# Patient Record
Sex: MALE | Race: WHITE | HISPANIC OR LATINO | Employment: OTHER | URBAN - METROPOLITAN AREA
[De-identification: names, ages, dates, MRNs, and addresses within clinical notes are randomized per-mention and may not be internally consistent; named-entity substitution may affect disease eponyms.]

---

## 2021-01-01 ENCOUNTER — APPOINTMENT (OUTPATIENT)
Dept: RADIOLOGY | Facility: MEDICAL CENTER | Age: 66
DRG: 207 | End: 2021-01-01
Attending: INTERNAL MEDICINE
Payer: MEDICAID

## 2021-01-01 ENCOUNTER — HOSPITAL ENCOUNTER (INPATIENT)
Dept: RADIOLOGY | Facility: MEDICAL CENTER | Age: 66
DRG: 207 | End: 2021-01-01
Attending: INTERNAL MEDICINE | Admitting: INTERNAL MEDICINE
Payer: MEDICAID

## 2021-01-01 ENCOUNTER — APPOINTMENT (OUTPATIENT)
Dept: CARDIOLOGY | Facility: MEDICAL CENTER | Age: 66
DRG: 207 | End: 2021-01-01
Attending: INTERNAL MEDICINE
Payer: MEDICAID

## 2021-01-01 ENCOUNTER — HOSPITAL ENCOUNTER (INPATIENT)
Facility: MEDICAL CENTER | Age: 66
LOS: 7 days | DRG: 207 | End: 2021-01-19
Attending: INTERNAL MEDICINE | Admitting: INTERNAL MEDICINE
Payer: MEDICAID

## 2021-01-01 VITALS
SYSTOLIC BLOOD PRESSURE: 117 MMHG | OXYGEN SATURATION: 91 % | HEIGHT: 71 IN | HEART RATE: 95 BPM | RESPIRATION RATE: 32 BRPM | TEMPERATURE: 98.4 F | WEIGHT: 141.54 LBS | BODY MASS INDEX: 19.81 KG/M2 | DIASTOLIC BLOOD PRESSURE: 62 MMHG

## 2021-01-01 DIAGNOSIS — J12.82 PNEUMONIA DUE TO COVID-19 VIRUS: ICD-10-CM

## 2021-01-01 DIAGNOSIS — J96.01 ACUTE RESPIRATORY FAILURE WITH HYPOXIA (HCC): ICD-10-CM

## 2021-01-01 DIAGNOSIS — U07.1 PNEUMONIA DUE TO COVID-19 VIRUS: ICD-10-CM

## 2021-01-01 LAB
ACTION RANGE TRIGGERED IACRT: NO
ACTION RANGE TRIGGERED IACRT: YES
ALBUMIN SERPL BCP-MCNC: 1.5 G/DL (ref 3.2–4.9)
ALBUMIN SERPL BCP-MCNC: 2.1 G/DL (ref 3.2–4.9)
ALBUMIN/GLOB SERPL: 0.4 G/DL
ALBUMIN/GLOB SERPL: 0.6 G/DL
ALP SERPL-CCNC: 110 U/L (ref 30–99)
ALP SERPL-CCNC: 62 U/L (ref 30–99)
ALT SERPL-CCNC: 327 U/L (ref 2–50)
ALT SERPL-CCNC: 35 U/L (ref 2–50)
ANION GAP SERPL CALC-SCNC: 10 MMOL/L (ref 7–16)
ANION GAP SERPL CALC-SCNC: 13 MMOL/L (ref 7–16)
ANION GAP SERPL CALC-SCNC: 18 MMOL/L (ref 7–16)
ANION GAP SERPL CALC-SCNC: 5 MMOL/L (ref 7–16)
ANION GAP SERPL CALC-SCNC: 8 MMOL/L (ref 7–16)
ANION GAP SERPL CALC-SCNC: 8 MMOL/L (ref 7–16)
ANION GAP SERPL CALC-SCNC: 9 MMOL/L (ref 7–16)
ANISOCYTOSIS BLD QL SMEAR: ABNORMAL
ANISOCYTOSIS BLD QL SMEAR: ABNORMAL
AST SERPL-CCNC: 56 U/L (ref 12–45)
AST SERPL-CCNC: 779 U/L (ref 12–45)
BACTERIA SPEC RESP CULT: NORMAL
BASE EXCESS BLDA CALC-SCNC: -1 MMOL/L (ref -4–3)
BASE EXCESS BLDA CALC-SCNC: -2 MMOL/L (ref -4–3)
BASE EXCESS BLDA CALC-SCNC: -4 MMOL/L (ref -4–3)
BASE EXCESS BLDA CALC-SCNC: -5 MMOL/L (ref -4–3)
BASE EXCESS BLDA CALC-SCNC: -5 MMOL/L (ref -4–3)
BASE EXCESS BLDA CALC-SCNC: -6 MMOL/L (ref -4–3)
BASE EXCESS BLDA CALC-SCNC: 0 MMOL/L (ref -4–3)
BASE EXCESS BLDA CALC-SCNC: 2 MMOL/L (ref -4–3)
BASOPHILS # BLD AUTO: 0 % (ref 0–1.8)
BASOPHILS # BLD AUTO: 0 % (ref 0–1.8)
BASOPHILS # BLD AUTO: 0.3 % (ref 0–1.8)
BASOPHILS # BLD AUTO: 0.6 % (ref 0–1.8)
BASOPHILS # BLD AUTO: 0.8 % (ref 0–1.8)
BASOPHILS # BLD: 0 K/UL (ref 0–0.12)
BASOPHILS # BLD: 0 K/UL (ref 0–0.12)
BASOPHILS # BLD: 0.04 K/UL (ref 0–0.12)
BASOPHILS # BLD: 0.05 K/UL (ref 0–0.12)
BASOPHILS # BLD: 0.05 K/UL (ref 0–0.12)
BASOPHILS # BLD: 0.06 K/UL (ref 0–0.12)
BASOPHILS # BLD: 0.12 K/UL (ref 0–0.12)
BILIRUB SERPL-MCNC: 0.3 MG/DL (ref 0.1–1.5)
BILIRUB SERPL-MCNC: 0.4 MG/DL (ref 0.1–1.5)
BODY TEMPERATURE: ABNORMAL DEGREES
BUN SERPL-MCNC: 11 MG/DL (ref 8–22)
BUN SERPL-MCNC: 16 MG/DL (ref 8–22)
BUN SERPL-MCNC: 16 MG/DL (ref 8–22)
BUN SERPL-MCNC: 17 MG/DL (ref 8–22)
BUN SERPL-MCNC: 18 MG/DL (ref 8–22)
BUN SERPL-MCNC: 40 MG/DL (ref 8–22)
BUN SERPL-MCNC: 68 MG/DL (ref 8–22)
BURR CELLS BLD QL SMEAR: NORMAL
C DIFF DNA SPEC QL NAA+PROBE: NEGATIVE
C DIFF TOX GENS STL QL NAA+PROBE: NEGATIVE
CALCIUM SERPL-MCNC: 7.5 MG/DL (ref 8.5–10.5)
CALCIUM SERPL-MCNC: 7.6 MG/DL (ref 8.5–10.5)
CALCIUM SERPL-MCNC: 7.7 MG/DL (ref 8.5–10.5)
CALCIUM SERPL-MCNC: 7.8 MG/DL (ref 8.5–10.5)
CHLORIDE SERPL-SCNC: 100 MMOL/L (ref 96–112)
CHLORIDE SERPL-SCNC: 101 MMOL/L (ref 96–112)
CHLORIDE SERPL-SCNC: 103 MMOL/L (ref 96–112)
CHLORIDE SERPL-SCNC: 104 MMOL/L (ref 96–112)
CHLORIDE SERPL-SCNC: 106 MMOL/L (ref 96–112)
CHLORIDE SERPL-SCNC: 108 MMOL/L (ref 96–112)
CHLORIDE SERPL-SCNC: 109 MMOL/L (ref 96–112)
CO2 BLDA-SCNC: 22 MMOL/L (ref 20–33)
CO2 BLDA-SCNC: 24 MMOL/L (ref 20–33)
CO2 BLDA-SCNC: 24 MMOL/L (ref 20–33)
CO2 BLDA-SCNC: 25 MMOL/L (ref 20–33)
CO2 BLDA-SCNC: 25 MMOL/L (ref 20–33)
CO2 BLDA-SCNC: 26 MMOL/L (ref 20–33)
CO2 BLDA-SCNC: 26 MMOL/L (ref 20–33)
CO2 BLDA-SCNC: 27 MMOL/L (ref 20–33)
CO2 BLDA-SCNC: 27 MMOL/L (ref 20–33)
CO2 BLDA-SCNC: 28 MMOL/L (ref 20–33)
CO2 BLDA-SCNC: 28 MMOL/L (ref 20–33)
CO2 BLDA-SCNC: 34 MMOL/L (ref 20–33)
CO2 SERPL-SCNC: 20 MMOL/L (ref 20–33)
CO2 SERPL-SCNC: 21 MMOL/L (ref 20–33)
CO2 SERPL-SCNC: 22 MMOL/L (ref 20–33)
CO2 SERPL-SCNC: 23 MMOL/L (ref 20–33)
CO2 SERPL-SCNC: 23 MMOL/L (ref 20–33)
CO2 SERPL-SCNC: 24 MMOL/L (ref 20–33)
CO2 SERPL-SCNC: 24 MMOL/L (ref 20–33)
CREAT SERPL-MCNC: 0.41 MG/DL (ref 0.5–1.4)
CREAT SERPL-MCNC: 0.64 MG/DL (ref 0.5–1.4)
CREAT SERPL-MCNC: 0.64 MG/DL (ref 0.5–1.4)
CREAT SERPL-MCNC: 0.73 MG/DL (ref 0.5–1.4)
CREAT SERPL-MCNC: 0.85 MG/DL (ref 0.5–1.4)
CREAT SERPL-MCNC: 2.37 MG/DL (ref 0.5–1.4)
CREAT SERPL-MCNC: 3.62 MG/DL (ref 0.5–1.4)
CRP SERPL HS-MCNC: 12.52 MG/DL (ref 0–0.75)
CRP SERPL HS-MCNC: 24.07 MG/DL (ref 0–0.75)
CRP SERPL HS-MCNC: 37.83 MG/DL (ref 0–0.75)
D DIMER PPP IA.FEU-MCNC: 6.41 UG/ML (FEU) (ref 0–0.5)
EKG IMPRESSION: NORMAL
EOSINOPHIL # BLD AUTO: 0 K/UL (ref 0–0.51)
EOSINOPHIL # BLD AUTO: 0.12 K/UL (ref 0–0.51)
EOSINOPHIL # BLD AUTO: 0.14 K/UL (ref 0–0.51)
EOSINOPHIL # BLD AUTO: 0.2 K/UL (ref 0–0.51)
EOSINOPHIL # BLD AUTO: 0.62 K/UL (ref 0–0.51)
EOSINOPHIL NFR BLD: 0 % (ref 0–6.9)
EOSINOPHIL NFR BLD: 0.7 % (ref 0–6.9)
EOSINOPHIL NFR BLD: 0.8 % (ref 0–6.9)
EOSINOPHIL NFR BLD: 1.3 % (ref 0–6.9)
EOSINOPHIL NFR BLD: 2.9 % (ref 0–6.9)
ERYTHROCYTE [DISTWIDTH] IN BLOOD BY AUTOMATED COUNT: 41.2 FL (ref 35.9–50)
ERYTHROCYTE [DISTWIDTH] IN BLOOD BY AUTOMATED COUNT: 41.9 FL (ref 35.9–50)
ERYTHROCYTE [DISTWIDTH] IN BLOOD BY AUTOMATED COUNT: 42.4 FL (ref 35.9–50)
ERYTHROCYTE [DISTWIDTH] IN BLOOD BY AUTOMATED COUNT: 43.3 FL (ref 35.9–50)
ERYTHROCYTE [DISTWIDTH] IN BLOOD BY AUTOMATED COUNT: 46.8 FL (ref 35.9–50)
ERYTHROCYTE [DISTWIDTH] IN BLOOD BY AUTOMATED COUNT: 49 FL (ref 35.9–50)
ERYTHROCYTE [DISTWIDTH] IN BLOOD BY AUTOMATED COUNT: 50.4 FL (ref 35.9–50)
FERRITIN SERPL-MCNC: 2280 NG/ML (ref 22–322)
GLOBULIN SER CALC-MCNC: 3.4 G/DL (ref 1.9–3.5)
GLOBULIN SER CALC-MCNC: 3.7 G/DL (ref 1.9–3.5)
GLUCOSE BLD-MCNC: 133 MG/DL (ref 65–99)
GLUCOSE BLD-MCNC: 158 MG/DL (ref 65–99)
GLUCOSE BLD-MCNC: 159 MG/DL (ref 65–99)
GLUCOSE BLD-MCNC: 166 MG/DL (ref 65–99)
GLUCOSE SERPL-MCNC: 123 MG/DL (ref 65–99)
GLUCOSE SERPL-MCNC: 153 MG/DL (ref 65–99)
GLUCOSE SERPL-MCNC: 161 MG/DL (ref 65–99)
GLUCOSE SERPL-MCNC: 193 MG/DL (ref 65–99)
GLUCOSE SERPL-MCNC: 215 MG/DL (ref 65–99)
GLUCOSE SERPL-MCNC: 94 MG/DL (ref 65–99)
GLUCOSE SERPL-MCNC: 95 MG/DL (ref 65–99)
GRAM STN SPEC: NORMAL
GRAM STN SPEC: NORMAL
HCO3 BLDA-SCNC: 21.3 MMOL/L (ref 17–25)
HCO3 BLDA-SCNC: 22.7 MMOL/L (ref 17–25)
HCO3 BLDA-SCNC: 23.4 MMOL/L (ref 17–25)
HCO3 BLDA-SCNC: 23.5 MMOL/L (ref 17–25)
HCO3 BLDA-SCNC: 23.8 MMOL/L (ref 17–25)
HCO3 BLDA-SCNC: 24.4 MMOL/L (ref 17–25)
HCO3 BLDA-SCNC: 24.6 MMOL/L (ref 17–25)
HCO3 BLDA-SCNC: 25.1 MMOL/L (ref 17–25)
HCO3 BLDA-SCNC: 25.7 MMOL/L (ref 17–25)
HCO3 BLDA-SCNC: 25.8 MMOL/L (ref 17–25)
HCO3 BLDA-SCNC: 26.4 MMOL/L (ref 17–25)
HCO3 BLDA-SCNC: 31.4 MMOL/L (ref 17–25)
HCT VFR BLD AUTO: 39.5 % (ref 42–52)
HCT VFR BLD AUTO: 39.7 % (ref 42–52)
HCT VFR BLD AUTO: 39.8 % (ref 42–52)
HCT VFR BLD AUTO: 40.6 % (ref 42–52)
HCT VFR BLD AUTO: 42.2 % (ref 42–52)
HCT VFR BLD AUTO: 43.4 % (ref 42–52)
HCT VFR BLD AUTO: 47.3 % (ref 42–52)
HGB BLD-MCNC: 12.4 G/DL (ref 14–18)
HGB BLD-MCNC: 12.8 G/DL (ref 14–18)
HGB BLD-MCNC: 12.8 G/DL (ref 14–18)
HGB BLD-MCNC: 13.3 G/DL (ref 14–18)
HGB BLD-MCNC: 13.7 G/DL (ref 14–18)
HGB BLD-MCNC: 14.3 G/DL (ref 14–18)
HGB BLD-MCNC: 14.8 G/DL (ref 14–18)
HOROWITZ INDEX BLDA+IHG-RTO: 104 MM[HG]
HOROWITZ INDEX BLDA+IHG-RTO: 105 MM[HG]
HOROWITZ INDEX BLDA+IHG-RTO: 106 MM[HG]
HOROWITZ INDEX BLDA+IHG-RTO: 112 MM[HG]
HOROWITZ INDEX BLDA+IHG-RTO: 131 MM[HG]
HOROWITZ INDEX BLDA+IHG-RTO: 38 MM[HG]
HOROWITZ INDEX BLDA+IHG-RTO: 46 MM[HG]
HOROWITZ INDEX BLDA+IHG-RTO: 51 MM[HG]
HOROWITZ INDEX BLDA+IHG-RTO: 63 MM[HG]
HOROWITZ INDEX BLDA+IHG-RTO: 73 MM[HG]
HOROWITZ INDEX BLDA+IHG-RTO: 81 MM[HG]
HOROWITZ INDEX BLDA+IHG-RTO: 89 MM[HG]
IMM GRANULOCYTES # BLD AUTO: 0.23 K/UL (ref 0–0.11)
IMM GRANULOCYTES # BLD AUTO: 0.31 K/UL (ref 0–0.11)
IMM GRANULOCYTES NFR BLD AUTO: 1.3 % (ref 0–0.9)
IMM GRANULOCYTES NFR BLD AUTO: 1.4 % (ref 0–0.9)
IMM GRANULOCYTES NFR BLD AUTO: 1.5 % (ref 0–0.9)
IMM GRANULOCYTES NFR BLD AUTO: 1.5 % (ref 0–0.9)
INST. QUALIFIED PATIENT IIQPT: YES
LACTATE BLD-SCNC: 2.6 MMOL/L (ref 0.5–2)
LACTATE BLD-SCNC: 3.5 MMOL/L (ref 0.5–2)
LACTATE BLD-SCNC: 3.8 MMOL/L (ref 0.5–2)
LV EJECT FRACT  99904: 75
LV EJECT FRACT MOD 2C 99903: 73.58
LV EJECT FRACT MOD 4C 99902: 76.25
LV EJECT FRACT MOD BP 99901: 74.66
LYMPHOCYTES # BLD AUTO: 0.1 K/UL (ref 1–4.8)
LYMPHOCYTES # BLD AUTO: 0.44 K/UL (ref 1–4.8)
LYMPHOCYTES # BLD AUTO: 0.55 K/UL (ref 1–4.8)
LYMPHOCYTES # BLD AUTO: 0.61 K/UL (ref 1–4.8)
LYMPHOCYTES # BLD AUTO: 0.67 K/UL (ref 1–4.8)
LYMPHOCYTES # BLD AUTO: 0.69 K/UL (ref 1–4.8)
LYMPHOCYTES # BLD AUTO: 0.77 K/UL (ref 1–4.8)
LYMPHOCYTES NFR BLD: 1.6 % (ref 22–41)
LYMPHOCYTES NFR BLD: 1.7 % (ref 22–41)
LYMPHOCYTES NFR BLD: 2.6 % (ref 22–41)
LYMPHOCYTES NFR BLD: 3.4 % (ref 22–41)
LYMPHOCYTES NFR BLD: 3.5 % (ref 22–41)
LYMPHOCYTES NFR BLD: 3.6 % (ref 22–41)
LYMPHOCYTES NFR BLD: 4.5 % (ref 22–41)
MACROCYTES BLD QL SMEAR: ABNORMAL
MACROCYTES BLD QL SMEAR: ABNORMAL
MAGNESIUM SERPL-MCNC: 2.1 MG/DL (ref 1.5–2.5)
MAGNESIUM SERPL-MCNC: 2.2 MG/DL (ref 1.5–2.5)
MAGNESIUM SERPL-MCNC: 2.4 MG/DL (ref 1.5–2.5)
MAGNESIUM SERPL-MCNC: 2.6 MG/DL (ref 1.5–2.5)
MANUAL DIFF BLD: ABNORMAL
MCH RBC QN AUTO: 29.9 PG (ref 27–33)
MCH RBC QN AUTO: 30 PG (ref 27–33)
MCH RBC QN AUTO: 30.3 PG (ref 27–33)
MCH RBC QN AUTO: 30.4 PG (ref 27–33)
MCH RBC QN AUTO: 30.5 PG (ref 27–33)
MCH RBC QN AUTO: 30.7 PG (ref 27–33)
MCH RBC QN AUTO: 31.1 PG (ref 27–33)
MCHC RBC AUTO-ENTMCNC: 30.3 G/DL (ref 33.7–35.3)
MCHC RBC AUTO-ENTMCNC: 31.3 G/DL (ref 33.7–35.3)
MCHC RBC AUTO-ENTMCNC: 31.4 G/DL (ref 33.7–35.3)
MCHC RBC AUTO-ENTMCNC: 32.2 G/DL (ref 33.7–35.3)
MCHC RBC AUTO-ENTMCNC: 32.9 G/DL (ref 33.7–35.3)
MCHC RBC AUTO-ENTMCNC: 33.4 G/DL (ref 33.7–35.3)
MCHC RBC AUTO-ENTMCNC: 33.7 G/DL (ref 33.7–35.3)
MCV RBC AUTO: 91 FL (ref 81.4–97.8)
MCV RBC AUTO: 92.1 FL (ref 81.4–97.8)
MCV RBC AUTO: 92.8 FL (ref 81.4–97.8)
MCV RBC AUTO: 93 FL (ref 81.4–97.8)
MCV RBC AUTO: 96.7 FL (ref 81.4–97.8)
MCV RBC AUTO: 97.1 FL (ref 81.4–97.8)
MCV RBC AUTO: 99.1 FL (ref 81.4–97.8)
METAMYELOCYTES NFR BLD MANUAL: 4.1 %
METAMYELOCYTES NFR BLD MANUAL: 5.3 %
METAMYELOCYTES NFR BLD MANUAL: 6.7 %
MICROCYTES BLD QL SMEAR: ABNORMAL
MONOCYTES # BLD AUTO: 0.16 K/UL (ref 0–0.85)
MONOCYTES # BLD AUTO: 0.59 K/UL (ref 0–0.85)
MONOCYTES # BLD AUTO: 0.63 K/UL (ref 0–0.85)
MONOCYTES # BLD AUTO: 0.75 K/UL (ref 0–0.85)
MONOCYTES # BLD AUTO: 0.87 K/UL (ref 0–0.85)
MONOCYTES # BLD AUTO: 1.12 K/UL (ref 0–0.85)
MONOCYTES # BLD AUTO: 1.14 K/UL (ref 0–0.85)
MONOCYTES NFR BLD AUTO: 2.5 % (ref 0–13.4)
MONOCYTES NFR BLD AUTO: 3.4 % (ref 0–13.4)
MONOCYTES NFR BLD AUTO: 3.8 % (ref 0–13.4)
MONOCYTES NFR BLD AUTO: 3.9 % (ref 0–13.4)
MONOCYTES NFR BLD AUTO: 4.2 % (ref 0–13.4)
MONOCYTES NFR BLD AUTO: 4.4 % (ref 0–13.4)
MONOCYTES NFR BLD AUTO: 5.3 % (ref 0–13.4)
MORPHOLOGY BLD-IMP: NORMAL
MRSA DNA SPEC QL NAA+PROBE: NORMAL
MYELOCYTES NFR BLD MANUAL: 0.8 %
MYELOCYTES NFR BLD MANUAL: 0.9 %
NEUTROPHILS # BLD AUTO: 13.73 K/UL (ref 1.82–7.42)
NEUTROPHILS # BLD AUTO: 14.71 K/UL (ref 1.82–7.42)
NEUTROPHILS # BLD AUTO: 15.86 K/UL (ref 1.82–7.42)
NEUTROPHILS # BLD AUTO: 18.2 K/UL (ref 1.82–7.42)
NEUTROPHILS # BLD AUTO: 22.51 K/UL (ref 1.82–7.42)
NEUTROPHILS # BLD AUTO: 22.58 K/UL (ref 1.82–7.42)
NEUTROPHILS # BLD AUTO: 5.86 K/UL (ref 1.82–7.42)
NEUTROPHILS NFR BLD: 70.6 % (ref 44–72)
NEUTROPHILS NFR BLD: 71.1 % (ref 44–72)
NEUTROPHILS NFR BLD: 71.3 % (ref 44–72)
NEUTROPHILS NFR BLD: 86.1 % (ref 44–72)
NEUTROPHILS NFR BLD: 88.6 % (ref 44–72)
NEUTROPHILS NFR BLD: 89.9 % (ref 44–72)
NEUTROPHILS NFR BLD: 90.3 % (ref 44–72)
NEUTS BAND NFR BLD MANUAL: 15.8 % (ref 0–10)
NEUTS BAND NFR BLD MANUAL: 17.6 % (ref 0–10)
NEUTS BAND NFR BLD MANUAL: 18.9 % (ref 0–10)
NRBC # BLD AUTO: 0 K/UL
NRBC # BLD AUTO: 0.02 K/UL
NRBC # BLD AUTO: 0.04 K/UL
NRBC BLD-RTO: 0 /100 WBC
NRBC BLD-RTO: 0.1 /100 WBC
NRBC BLD-RTO: 0.6 /100 WBC
O2/TOTAL GAS SETTING VFR VENT: 100 %
O2/TOTAL GAS SETTING VFR VENT: 50 %
O2/TOTAL GAS SETTING VFR VENT: 55 %
O2/TOTAL GAS SETTING VFR VENT: 60 %
O2/TOTAL GAS SETTING VFR VENT: 80 %
O2/TOTAL GAS SETTING VFR VENT: 90 %
PCO2 BLDA: 28.7 MMHG (ref 26–37)
PCO2 BLDA: 36.5 MMHG (ref 26–37)
PCO2 BLDA: 36.6 MMHG (ref 26–37)
PCO2 BLDA: 40.6 MMHG (ref 26–37)
PCO2 BLDA: 45.4 MMHG (ref 26–37)
PCO2 BLDA: 50.1 MMHG (ref 26–37)
PCO2 BLDA: 51.6 MMHG (ref 26–37)
PCO2 BLDA: 53.6 MMHG (ref 26–37)
PCO2 BLDA: 67.6 MMHG (ref 26–37)
PCO2 BLDA: 72 MMHG (ref 26–37)
PCO2 BLDA: 73.1 MMHG (ref 26–37)
PCO2 BLDA: 84.1 MMHG (ref 26–37)
PCO2 TEMP ADJ BLDA: 28.2 MMHG (ref 26–37)
PCO2 TEMP ADJ BLDA: 36.1 MMHG (ref 26–37)
PCO2 TEMP ADJ BLDA: 36.8 MMHG (ref 26–37)
PCO2 TEMP ADJ BLDA: 42.3 MMHG (ref 26–37)
PCO2 TEMP ADJ BLDA: 44.3 MMHG (ref 26–37)
PCO2 TEMP ADJ BLDA: 48.5 MMHG (ref 26–37)
PCO2 TEMP ADJ BLDA: 51.8 MMHG (ref 26–37)
PCO2 TEMP ADJ BLDA: 51.8 MMHG (ref 26–37)
PCO2 TEMP ADJ BLDA: 69.3 MMHG (ref 26–37)
PCO2 TEMP ADJ BLDA: 72.3 MMHG (ref 26–37)
PCO2 TEMP ADJ BLDA: 73.1 MMHG (ref 26–37)
PCO2 TEMP ADJ BLDA: 93.8 MMHG (ref 26–37)
PH BLDA: 7.09 [PH] (ref 7.4–7.5)
PH BLDA: 7.14 [PH] (ref 7.4–7.5)
PH BLDA: 7.17 [PH] (ref 7.4–7.5)
PH BLDA: 7.25 [PH] (ref 7.4–7.5)
PH BLDA: 7.27 [PH] (ref 7.4–7.5)
PH BLDA: 7.29 [PH] (ref 7.4–7.5)
PH BLDA: 7.33 [PH] (ref 7.4–7.5)
PH BLDA: 7.34 [PH] (ref 7.4–7.5)
PH BLDA: 7.38 [PH] (ref 7.4–7.5)
PH BLDA: 7.4 [PH] (ref 7.4–7.5)
PH BLDA: 7.41 [PH] (ref 7.4–7.5)
PH BLDA: 7.48 [PH] (ref 7.4–7.5)
PH TEMP ADJ BLDA: 7.06 [PH] (ref 7.4–7.5)
PH TEMP ADJ BLDA: 7.14 [PH] (ref 7.4–7.5)
PH TEMP ADJ BLDA: 7.16 [PH] (ref 7.4–7.5)
PH TEMP ADJ BLDA: 7.25 [PH] (ref 7.4–7.5)
PH TEMP ADJ BLDA: 7.26 [PH] (ref 7.4–7.5)
PH TEMP ADJ BLDA: 7.3 [PH] (ref 7.4–7.5)
PH TEMP ADJ BLDA: 7.34 [PH] (ref 7.4–7.5)
PH TEMP ADJ BLDA: 7.35 [PH] (ref 7.4–7.5)
PH TEMP ADJ BLDA: 7.36 [PH] (ref 7.4–7.5)
PH TEMP ADJ BLDA: 7.4 [PH] (ref 7.4–7.5)
PH TEMP ADJ BLDA: 7.42 [PH] (ref 7.4–7.5)
PH TEMP ADJ BLDA: 7.48 [PH] (ref 7.4–7.5)
PHENYTOIN SERPL-MCNC: <0.8 UG/ML (ref 10–20)
PHOSPHATE SERPL-MCNC: 1.8 MG/DL (ref 2.5–4.5)
PHOSPHATE SERPL-MCNC: 2.2 MG/DL (ref 2.5–4.5)
PHOSPHATE SERPL-MCNC: 2.7 MG/DL (ref 2.5–4.5)
PHOSPHATE SERPL-MCNC: 3.2 MG/DL (ref 2.5–4.5)
PHOSPHATE SERPL-MCNC: 3.7 MG/DL (ref 2.5–4.5)
PHOSPHATE SERPL-MCNC: 5.2 MG/DL (ref 2.5–4.5)
PLATELET # BLD AUTO: 219 K/UL (ref 164–446)
PLATELET # BLD AUTO: 237 K/UL (ref 164–446)
PLATELET # BLD AUTO: 246 K/UL (ref 164–446)
PLATELET # BLD AUTO: 255 K/UL (ref 164–446)
PLATELET # BLD AUTO: 272 K/UL (ref 164–446)
PLATELET # BLD AUTO: 276 K/UL (ref 164–446)
PLATELET # BLD AUTO: 297 K/UL (ref 164–446)
PLATELET BLD QL SMEAR: NORMAL
PMV BLD AUTO: 10.8 FL (ref 9–12.9)
PMV BLD AUTO: 11 FL (ref 9–12.9)
PMV BLD AUTO: 9.3 FL (ref 9–12.9)
PMV BLD AUTO: 9.3 FL (ref 9–12.9)
PMV BLD AUTO: 9.5 FL (ref 9–12.9)
PMV BLD AUTO: 9.5 FL (ref 9–12.9)
PMV BLD AUTO: 9.7 FL (ref 9–12.9)
PO2 BLDA: 131 MMHG (ref 64–87)
PO2 BLDA: 38 MMHG (ref 64–87)
PO2 BLDA: 46 MMHG (ref 64–87)
PO2 BLDA: 51 MMHG (ref 64–87)
PO2 BLDA: 53 MMHG (ref 64–87)
PO2 BLDA: 58 MMHG (ref 64–87)
PO2 BLDA: 63 MMHG (ref 64–87)
PO2 BLDA: 67 MMHG (ref 64–87)
PO2 BLDA: 73 MMHG (ref 64–87)
PO2 BLDA: 73 MMHG (ref 64–87)
PO2 BLDA: 83 MMHG (ref 64–87)
PO2 BLDA: 89 MMHG (ref 64–87)
PO2 TEMP ADJ BLDA: 128 MMHG (ref 64–87)
PO2 TEMP ADJ BLDA: 38 MMHG (ref 64–87)
PO2 TEMP ADJ BLDA: 44 MMHG (ref 64–87)
PO2 TEMP ADJ BLDA: 50 MMHG (ref 64–87)
PO2 TEMP ADJ BLDA: 51 MMHG (ref 64–87)
PO2 TEMP ADJ BLDA: 59 MMHG (ref 64–87)
PO2 TEMP ADJ BLDA: 66 MMHG (ref 64–87)
PO2 TEMP ADJ BLDA: 68 MMHG (ref 64–87)
PO2 TEMP ADJ BLDA: 77 MMHG (ref 64–87)
PO2 TEMP ADJ BLDA: 79 MMHG (ref 64–87)
PO2 TEMP ADJ BLDA: 87 MMHG (ref 64–87)
PO2 TEMP ADJ BLDA: 90 MMHG (ref 64–87)
POIKILOCYTOSIS BLD QL SMEAR: NORMAL
POIKILOCYTOSIS BLD QL SMEAR: NORMAL
POLYCHROMASIA BLD QL SMEAR: NORMAL
POTASSIUM SERPL-SCNC: 3.4 MMOL/L (ref 3.6–5.5)
POTASSIUM SERPL-SCNC: 3.8 MMOL/L (ref 3.6–5.5)
POTASSIUM SERPL-SCNC: 3.9 MMOL/L (ref 3.6–5.5)
POTASSIUM SERPL-SCNC: 4.2 MMOL/L (ref 3.6–5.5)
POTASSIUM SERPL-SCNC: 4.5 MMOL/L (ref 3.6–5.5)
POTASSIUM SERPL-SCNC: 5.7 MMOL/L (ref 3.6–5.5)
POTASSIUM SERPL-SCNC: 5.7 MMOL/L (ref 3.6–5.5)
PREALB SERPL-MCNC: 12 MG/DL (ref 18–38)
PREALB SERPL-MCNC: 5.4 MG/DL (ref 18–38)
PROCALCITONIN SERPL-MCNC: 9.85 NG/ML
PROT SERPL-MCNC: 5.2 G/DL (ref 6–8.2)
PROT SERPL-MCNC: 5.5 G/DL (ref 6–8.2)
RBC # BLD AUTO: 4.07 M/UL (ref 4.7–6.1)
RBC # BLD AUTO: 4.26 M/UL (ref 4.7–6.1)
RBC # BLD AUTO: 4.28 M/UL (ref 4.7–6.1)
RBC # BLD AUTO: 4.28 M/UL (ref 4.7–6.1)
RBC # BLD AUTO: 4.46 M/UL (ref 4.7–6.1)
RBC # BLD AUTO: 4.71 M/UL (ref 4.7–6.1)
RBC # BLD AUTO: 4.89 M/UL (ref 4.7–6.1)
RBC BLD AUTO: PRESENT
SAO2 % BLDA: 73 % (ref 93–99)
SAO2 % BLDA: 74 % (ref 93–99)
SAO2 % BLDA: 84 % (ref 93–99)
SAO2 % BLDA: 84 % (ref 93–99)
SAO2 % BLDA: 85 % (ref 93–99)
SAO2 % BLDA: 85 % (ref 93–99)
SAO2 % BLDA: 86 % (ref 93–99)
SAO2 % BLDA: 89 % (ref 93–99)
SAO2 % BLDA: 94 % (ref 93–99)
SAO2 % BLDA: 95 % (ref 93–99)
SAO2 % BLDA: 97 % (ref 93–99)
SAO2 % BLDA: 99 % (ref 93–99)
SIGNIFICANT IND 70042: NORMAL
SITE SITE: NORMAL
SODIUM SERPL-SCNC: 132 MMOL/L (ref 135–145)
SODIUM SERPL-SCNC: 134 MMOL/L (ref 135–145)
SODIUM SERPL-SCNC: 135 MMOL/L (ref 135–145)
SODIUM SERPL-SCNC: 136 MMOL/L (ref 135–145)
SODIUM SERPL-SCNC: 136 MMOL/L (ref 135–145)
SODIUM SERPL-SCNC: 139 MMOL/L (ref 135–145)
SODIUM SERPL-SCNC: 147 MMOL/L (ref 135–145)
SOURCE SOURCE: NORMAL
SPECIMEN DRAWN FROM PATIENT: ABNORMAL
TRIGL SERPL-MCNC: 206 MG/DL (ref 0–149)
TRIGL SERPL-MCNC: 284 MG/DL (ref 0–149)
TRIGL SERPL-MCNC: 370 MG/DL (ref 0–149)
VANCOMYCIN SERPL-MCNC: 11.8 UG/ML
WBC # BLD AUTO: 15.5 K/UL (ref 4.8–10.8)
WBC # BLD AUTO: 16.3 K/UL (ref 4.8–10.8)
WBC # BLD AUTO: 17.7 K/UL (ref 4.8–10.8)
WBC # BLD AUTO: 21.1 K/UL (ref 4.8–10.8)
WBC # BLD AUTO: 25.6 K/UL (ref 4.8–10.8)
WBC # BLD AUTO: 25.9 K/UL (ref 4.8–10.8)
WBC # BLD AUTO: 6.5 K/UL (ref 4.8–10.8)

## 2021-01-01 PROCEDURE — 5A1935Z RESPIRATORY VENTILATION, LESS THAN 24 CONSECUTIVE HOURS: ICD-10-PCS | Performed by: INTERNAL MEDICINE

## 2021-01-01 PROCEDURE — 99291 CRITICAL CARE FIRST HOUR: CPT | Performed by: EMERGENCY MEDICINE

## 2021-01-01 PROCEDURE — 94799 UNLISTED PULMONARY SVC/PX: CPT

## 2021-01-01 PROCEDURE — 700111 HCHG RX REV CODE 636 W/ 250 OVERRIDE (IP): Performed by: INTERNAL MEDICINE

## 2021-01-01 PROCEDURE — 0B9F8ZX DRAINAGE OF RIGHT LOWER LUNG LOBE, VIA NATURAL OR ARTIFICIAL OPENING ENDOSCOPIC, DIAGNOSTIC: ICD-10-PCS | Performed by: INTERNAL MEDICINE

## 2021-01-01 PROCEDURE — 83735 ASSAY OF MAGNESIUM: CPT

## 2021-01-01 PROCEDURE — 700102 HCHG RX REV CODE 250 W/ 637 OVERRIDE(OP): Performed by: INTERNAL MEDICINE

## 2021-01-01 PROCEDURE — 87641 MR-STAPH DNA AMP PROBE: CPT

## 2021-01-01 PROCEDURE — 80053 COMPREHEN METABOLIC PANEL: CPT

## 2021-01-01 PROCEDURE — 84100 ASSAY OF PHOSPHORUS: CPT

## 2021-01-01 PROCEDURE — 87070 CULTURE OTHR SPECIMN AEROBIC: CPT

## 2021-01-01 PROCEDURE — 302978 HCHG BRONCHOSCOPY-DIAGNOSTIC

## 2021-01-01 PROCEDURE — 94760 N-INVAS EAR/PLS OXIMETRY 1: CPT

## 2021-01-01 PROCEDURE — 94003 VENT MGMT INPAT SUBQ DAY: CPT

## 2021-01-01 PROCEDURE — A9270 NON-COVERED ITEM OR SERVICE: HCPCS | Performed by: INTERNAL MEDICINE

## 2021-01-01 PROCEDURE — 0B958ZZ DRAINAGE OF RIGHT MIDDLE LOBE BRONCHUS, VIA NATURAL OR ARTIFICIAL OPENING ENDOSCOPIC: ICD-10-PCS | Performed by: INTERNAL MEDICINE

## 2021-01-01 PROCEDURE — 36620 INSERTION CATHETER ARTERY: CPT | Performed by: INTERNAL MEDICINE

## 2021-01-01 PROCEDURE — 87040 BLOOD CULTURE FOR BACTERIA: CPT

## 2021-01-01 PROCEDURE — 86140 C-REACTIVE PROTEIN: CPT

## 2021-01-01 PROCEDURE — 700101 HCHG RX REV CODE 250: Performed by: INTERNAL MEDICINE

## 2021-01-01 PROCEDURE — 03HY32Z INSERTION OF MONITORING DEVICE INTO UPPER ARTERY, PERCUTANEOUS APPROACH: ICD-10-PCS | Performed by: INTERNAL MEDICINE

## 2021-01-01 PROCEDURE — 84478 ASSAY OF TRIGLYCERIDES: CPT

## 2021-01-01 PROCEDURE — 82962 GLUCOSE BLOOD TEST: CPT | Mod: 91

## 2021-01-01 PROCEDURE — 93005 ELECTROCARDIOGRAM TRACING: CPT | Performed by: INTERNAL MEDICINE

## 2021-01-01 PROCEDURE — 0B9B8ZZ DRAINAGE OF LEFT LOWER LOBE BRONCHUS, VIA NATURAL OR ARTIFICIAL OPENING ENDOSCOPIC: ICD-10-PCS | Performed by: INTERNAL MEDICINE

## 2021-01-01 PROCEDURE — 0B968ZZ DRAINAGE OF RIGHT LOWER LOBE BRONCHUS, VIA NATURAL OR ARTIFICIAL OPENING ENDOSCOPIC: ICD-10-PCS | Performed by: INTERNAL MEDICINE

## 2021-01-01 PROCEDURE — 80202 ASSAY OF VANCOMYCIN: CPT

## 2021-01-01 PROCEDURE — 770022 HCHG ROOM/CARE - ICU (200)

## 2021-01-01 PROCEDURE — 82803 BLOOD GASES ANY COMBINATION: CPT

## 2021-01-01 PROCEDURE — 83605 ASSAY OF LACTIC ACID: CPT | Mod: 91

## 2021-01-01 PROCEDURE — 99292 CRITICAL CARE ADDL 30 MIN: CPT | Mod: 25 | Performed by: INTERNAL MEDICINE

## 2021-01-01 PROCEDURE — 71045 X-RAY EXAM CHEST 1 VIEW: CPT

## 2021-01-01 PROCEDURE — 700105 HCHG RX REV CODE 258: Performed by: INTERNAL MEDICINE

## 2021-01-01 PROCEDURE — 85025 COMPLETE CBC W/AUTO DIFF WBC: CPT

## 2021-01-01 PROCEDURE — 82728 ASSAY OF FERRITIN: CPT

## 2021-01-01 PROCEDURE — 94640 AIRWAY INHALATION TREATMENT: CPT

## 2021-01-01 PROCEDURE — 0BH17EZ INSERTION OF ENDOTRACHEAL AIRWAY INTO TRACHEA, VIA NATURAL OR ARTIFICIAL OPENING: ICD-10-PCS | Performed by: INTERNAL MEDICINE

## 2021-01-01 PROCEDURE — 85007 BL SMEAR W/DIFF WBC COUNT: CPT

## 2021-01-01 PROCEDURE — 84134 ASSAY OF PREALBUMIN: CPT

## 2021-01-01 PROCEDURE — 85379 FIBRIN DEGRADATION QUANT: CPT

## 2021-01-01 PROCEDURE — 0B988ZZ DRAINAGE OF LEFT UPPER LOBE BRONCHUS, VIA NATURAL OR ARTIFICIAL OPENING ENDOSCOPIC: ICD-10-PCS | Performed by: INTERNAL MEDICINE

## 2021-01-01 PROCEDURE — 99291 CRITICAL CARE FIRST HOUR: CPT | Performed by: INTERNAL MEDICINE

## 2021-01-01 PROCEDURE — 0B948ZZ DRAINAGE OF RIGHT UPPER LOBE BRONCHUS, VIA NATURAL OR ARTIFICIAL OPENING ENDOSCOPIC: ICD-10-PCS | Performed by: INTERNAL MEDICINE

## 2021-01-01 PROCEDURE — 36556 INSERT NON-TUNNEL CV CATH: CPT

## 2021-01-01 PROCEDURE — 85027 COMPLETE CBC AUTOMATED: CPT

## 2021-01-01 PROCEDURE — 93321 DOPPLER ECHO F-UP/LMTD STD: CPT | Mod: 26 | Performed by: INTERNAL MEDICINE

## 2021-01-01 PROCEDURE — 93010 ELECTROCARDIOGRAM REPORT: CPT | Mod: 76 | Performed by: INTERNAL MEDICINE

## 2021-01-01 PROCEDURE — 93308 TTE F-UP OR LMTD: CPT | Mod: 26 | Performed by: INTERNAL MEDICINE

## 2021-01-01 PROCEDURE — 02HV33Z INSERTION OF INFUSION DEVICE INTO SUPERIOR VENA CAVA, PERCUTANEOUS APPROACH: ICD-10-PCS | Performed by: INTERNAL MEDICINE

## 2021-01-01 PROCEDURE — 80185 ASSAY OF PHENYTOIN TOTAL: CPT

## 2021-01-01 PROCEDURE — 99292 CRITICAL CARE ADDL 30 MIN: CPT | Performed by: INTERNAL MEDICINE

## 2021-01-01 PROCEDURE — 31500 INSERT EMERGENCY AIRWAY: CPT

## 2021-01-01 PROCEDURE — 31645 BRNCHSC W/THER ASPIR 1ST: CPT | Performed by: INTERNAL MEDICINE

## 2021-01-01 PROCEDURE — B548ZZA ULTRASONOGRAPHY OF SUPERIOR VENA CAVA, GUIDANCE: ICD-10-PCS | Performed by: INTERNAL MEDICINE

## 2021-01-01 PROCEDURE — 93010 ELECTROCARDIOGRAM REPORT: CPT | Performed by: INTERNAL MEDICINE

## 2021-01-01 PROCEDURE — 700101 HCHG RX REV CODE 250

## 2021-01-01 PROCEDURE — 37799 UNLISTED PX VASCULAR SURGERY: CPT

## 2021-01-01 PROCEDURE — 87205 SMEAR GRAM STAIN: CPT

## 2021-01-01 PROCEDURE — 80048 BASIC METABOLIC PNL TOTAL CA: CPT

## 2021-01-01 PROCEDURE — 4A133B1 MONITORING OF ARTERIAL PRESSURE, PERIPHERAL, PERCUTANEOUS APPROACH: ICD-10-PCS | Performed by: INTERNAL MEDICINE

## 2021-01-01 PROCEDURE — 93325 DOPPLER ECHO COLOR FLOW MAPG: CPT

## 2021-01-01 PROCEDURE — 36556 INSERT NON-TUNNEL CV CATH: CPT | Mod: RT | Performed by: INTERNAL MEDICINE

## 2021-01-01 PROCEDURE — 99291 CRITICAL CARE FIRST HOUR: CPT | Mod: 25 | Performed by: INTERNAL MEDICINE

## 2021-01-01 PROCEDURE — 36600 WITHDRAWAL OF ARTERIAL BLOOD: CPT

## 2021-01-01 PROCEDURE — 5A1955Z RESPIRATORY VENTILATION, GREATER THAN 96 CONSECUTIVE HOURS: ICD-10-PCS | Performed by: INTERNAL MEDICINE

## 2021-01-01 PROCEDURE — C1751 CATH, INF, PER/CENT/MIDLINE: HCPCS

## 2021-01-01 PROCEDURE — 36620 INSERTION CATHETER ARTERY: CPT

## 2021-01-01 PROCEDURE — 94002 VENT MGMT INPAT INIT DAY: CPT

## 2021-01-01 PROCEDURE — 31624 DX BRONCHOSCOPE/LAVAGE: CPT | Mod: 51 | Performed by: INTERNAL MEDICINE

## 2021-01-01 PROCEDURE — 31500 INSERT EMERGENCY AIRWAY: CPT | Performed by: INTERNAL MEDICINE

## 2021-01-01 PROCEDURE — 82962 GLUCOSE BLOOD TEST: CPT

## 2021-01-01 PROCEDURE — 0BP1XDZ REMOVAL OF INTRALUMINAL DEVICE FROM TRACHEA, EXTERNAL APPROACH: ICD-10-PCS | Performed by: INTERNAL MEDICINE

## 2021-01-01 PROCEDURE — 4A133J1 MONITORING OF ARTERIAL PULSE, PERIPHERAL, PERCUTANEOUS APPROACH: ICD-10-PCS | Performed by: INTERNAL MEDICINE

## 2021-01-01 PROCEDURE — 87493 C DIFF AMPLIFIED PROBE: CPT

## 2021-01-01 PROCEDURE — 84145 PROCALCITONIN (PCT): CPT

## 2021-01-01 PROCEDURE — 0B938ZZ DRAINAGE OF RIGHT MAIN BRONCHUS, VIA NATURAL OR ARTIFICIAL OPENING ENDOSCOPIC: ICD-10-PCS | Performed by: INTERNAL MEDICINE

## 2021-01-01 RX ORDER — TRIHEXYPHENIDYL HYDROCHLORIDE 5 MG/1
5 TABLET ORAL
COMMUNITY

## 2021-01-01 RX ORDER — PHENYTOIN 125 MG/5ML
200 SUSPENSION ORAL EVERY 8 HOURS
Status: DISCONTINUED | OUTPATIENT
Start: 2021-01-01 | End: 2021-01-01

## 2021-01-01 RX ORDER — MIDAZOLAM HYDROCHLORIDE 1 MG/ML
1-2 INJECTION INTRAMUSCULAR; INTRAVENOUS ONCE
Status: COMPLETED | OUTPATIENT
Start: 2021-01-01 | End: 2021-01-01

## 2021-01-01 RX ORDER — NOREPINEPHRINE BITARTRATE 0.03 MG/ML
0-30 INJECTION, SOLUTION INTRAVENOUS CONTINUOUS
Status: DISCONTINUED | OUTPATIENT
Start: 2021-01-01 | End: 2021-01-01 | Stop reason: HOSPADM

## 2021-01-01 RX ORDER — ACETAMINOPHEN 325 MG/1
650 TABLET ORAL EVERY 6 HOURS PRN
Status: DISCONTINUED | OUTPATIENT
Start: 2021-01-01 | End: 2021-01-01 | Stop reason: HOSPADM

## 2021-01-01 RX ORDER — FAMOTIDINE 20 MG/1
20 TABLET, FILM COATED ORAL EVERY 12 HOURS
Status: DISCONTINUED | OUTPATIENT
Start: 2021-01-01 | End: 2021-01-01

## 2021-01-01 RX ORDER — TRAMADOL HYDROCHLORIDE 50 MG/1
50 TABLET ORAL
COMMUNITY

## 2021-01-01 RX ORDER — SODIUM CHLORIDE 9 MG/ML
1000 INJECTION, SOLUTION INTRAVENOUS ONCE
Status: COMPLETED | OUTPATIENT
Start: 2021-01-01 | End: 2021-01-01

## 2021-01-01 RX ORDER — ROCURONIUM BROMIDE 10 MG/ML
100 INJECTION, SOLUTION INTRAVENOUS ONCE
Status: COMPLETED | OUTPATIENT
Start: 2021-01-01 | End: 2021-01-01

## 2021-01-01 RX ORDER — VECURONIUM BROMIDE 1 MG/ML
0.1 INJECTION, POWDER, LYOPHILIZED, FOR SOLUTION INTRAVENOUS
Status: DISCONTINUED | OUTPATIENT
Start: 2021-01-01 | End: 2021-01-01

## 2021-01-01 RX ORDER — LEVETIRACETAM 500 MG/1
500 TABLET ORAL EVERY 12 HOURS
Status: DISCONTINUED | OUTPATIENT
Start: 2021-01-01 | End: 2021-01-01

## 2021-01-01 RX ORDER — LORAZEPAM 2 MG/ML
2-4 INJECTION INTRAMUSCULAR
Status: COMPLETED | OUTPATIENT
Start: 2021-01-01 | End: 2021-01-01

## 2021-01-01 RX ORDER — ONDANSETRON 4 MG/1
4 TABLET, ORALLY DISINTEGRATING ORAL EVERY 6 HOURS PRN
Status: DISCONTINUED | OUTPATIENT
Start: 2021-01-01 | End: 2021-01-01 | Stop reason: HOSPADM

## 2021-01-01 RX ORDER — ACETAMINOPHEN 325 MG/1
650 TABLET ORAL EVERY 6 HOURS PRN
Status: DISCONTINUED | OUTPATIENT
Start: 2021-01-01 | End: 2021-01-01

## 2021-01-01 RX ORDER — LEVETIRACETAM 500 MG/1
500 TABLET ORAL 2 TIMES DAILY
COMMUNITY

## 2021-01-01 RX ORDER — LEVETIRACETAM 500 MG/1
500 TABLET ORAL 2 TIMES DAILY
Status: DISCONTINUED | OUTPATIENT
Start: 2021-01-01 | End: 2021-01-01 | Stop reason: HOSPADM

## 2021-01-01 RX ORDER — LORAZEPAM 2 MG/ML
2-4 INJECTION INTRAMUSCULAR ONCE
Status: COMPLETED | OUTPATIENT
Start: 2021-01-01 | End: 2021-01-01

## 2021-01-01 RX ORDER — PHENYLEPHRINE HCL IN 0.9% NACL 0.5 MG/5ML
100 SYRINGE (ML) INTRAVENOUS
Status: DISCONTINUED | OUTPATIENT
Start: 2021-01-01 | End: 2021-01-01

## 2021-01-01 RX ORDER — DEXTROSE MONOHYDRATE 25 G/50ML
50 INJECTION, SOLUTION INTRAVENOUS ONCE
Status: COMPLETED | OUTPATIENT
Start: 2021-01-01 | End: 2021-01-01

## 2021-01-01 RX ORDER — DEXTROSE MONOHYDRATE 25 G/50ML
50 INJECTION, SOLUTION INTRAVENOUS
Status: DISCONTINUED | OUTPATIENT
Start: 2021-01-01 | End: 2021-01-01 | Stop reason: HOSPADM

## 2021-01-01 RX ORDER — PHENYTOIN SODIUM 50 MG/ML
200 INJECTION INTRAMUSCULAR; INTRAVENOUS EVERY 8 HOURS
Status: DISCONTINUED | OUTPATIENT
Start: 2021-01-01 | End: 2021-01-01 | Stop reason: HOSPADM

## 2021-01-01 RX ORDER — PHENYTOIN SODIUM 100 MG/1
300 CAPSULE, EXTENDED RELEASE ORAL 2 TIMES DAILY
COMMUNITY

## 2021-01-01 RX ORDER — LEVETIRACETAM 5 MG/ML
500 INJECTION INTRAVASCULAR EVERY 12 HOURS
Status: DISCONTINUED | OUTPATIENT
Start: 2021-01-01 | End: 2021-01-01

## 2021-01-01 RX ORDER — ROCURONIUM BROMIDE 10 MG/ML
60 INJECTION, SOLUTION INTRAVENOUS ONCE
Status: COMPLETED | OUTPATIENT
Start: 2021-01-01 | End: 2021-01-01

## 2021-01-01 RX ORDER — ONDANSETRON 4 MG/1
4 TABLET, ORALLY DISINTEGRATING ORAL EVERY 6 HOURS PRN
Status: DISCONTINUED | OUTPATIENT
Start: 2021-01-01 | End: 2021-01-01

## 2021-01-01 RX ORDER — DEXMEDETOMIDINE HYDROCHLORIDE 4 UG/ML
INJECTION, SOLUTION INTRAVENOUS
Status: COMPLETED
Start: 2021-01-01 | End: 2021-01-01

## 2021-01-01 RX ORDER — FAMOTIDINE 20 MG/1
20 TABLET, FILM COATED ORAL DAILY
Status: DISCONTINUED | OUTPATIENT
Start: 2021-01-01 | End: 2021-01-01 | Stop reason: HOSPADM

## 2021-01-01 RX ORDER — ONDANSETRON 2 MG/ML
4 INJECTION INTRAMUSCULAR; INTRAVENOUS EVERY 6 HOURS PRN
Status: DISCONTINUED | OUTPATIENT
Start: 2021-01-01 | End: 2021-01-01 | Stop reason: HOSPADM

## 2021-01-01 RX ORDER — HALOPERIDOL 5 MG/ML
2.5 INJECTION INTRAMUSCULAR 2 TIMES DAILY
Status: DISCONTINUED | OUTPATIENT
Start: 2021-01-01 | End: 2021-01-01

## 2021-01-01 RX ORDER — LABETALOL HYDROCHLORIDE 5 MG/ML
10 INJECTION, SOLUTION INTRAVENOUS EVERY 6 HOURS PRN
Status: DISCONTINUED | OUTPATIENT
Start: 2021-01-01 | End: 2021-01-01 | Stop reason: HOSPADM

## 2021-01-01 RX ORDER — SODIUM CHLORIDE, SODIUM LACTATE, POTASSIUM CHLORIDE, CALCIUM CHLORIDE 600; 310; 30; 20 MG/100ML; MG/100ML; MG/100ML; MG/100ML
1000 INJECTION, SOLUTION INTRAVENOUS ONCE
Status: COMPLETED | OUTPATIENT
Start: 2021-01-01 | End: 2021-01-01

## 2021-01-01 RX ORDER — ETOMIDATE 2 MG/ML
20 INJECTION INTRAVENOUS ONCE
Status: COMPLETED | OUTPATIENT
Start: 2021-01-01 | End: 2021-01-01

## 2021-01-01 RX ORDER — HALOPERIDOL 5 MG/ML
2-5 INJECTION INTRAMUSCULAR EVERY 4 HOURS PRN
Status: DISCONTINUED | OUTPATIENT
Start: 2021-01-01 | End: 2021-01-01

## 2021-01-01 RX ORDER — HALOPERIDOL 5 MG/ML
1 INJECTION INTRAMUSCULAR EVERY 4 HOURS PRN
Status: DISCONTINUED | OUTPATIENT
Start: 2021-01-01 | End: 2021-01-01

## 2021-01-01 RX ORDER — HALOPERIDOL 2 MG/1
2 TABLET ORAL 2 TIMES DAILY
Status: DISCONTINUED | OUTPATIENT
Start: 2021-01-01 | End: 2021-01-01 | Stop reason: HOSPADM

## 2021-01-01 RX ORDER — SODIUM CHLORIDE 9 MG/ML
250 INJECTION, SOLUTION INTRAVENOUS ONCE
Status: COMPLETED | OUTPATIENT
Start: 2021-01-01 | End: 2021-01-01

## 2021-01-01 RX ORDER — BISACODYL 10 MG
10 SUPPOSITORY, RECTAL RECTAL
Status: DISCONTINUED | OUTPATIENT
Start: 2021-01-01 | End: 2021-01-01

## 2021-01-01 RX ORDER — FUROSEMIDE 10 MG/ML
40 INJECTION INTRAMUSCULAR; INTRAVENOUS
Status: DISCONTINUED | OUTPATIENT
Start: 2021-01-01 | End: 2021-01-01

## 2021-01-01 RX ORDER — POLYETHYLENE GLYCOL 3350 17 G/17G
1 POWDER, FOR SOLUTION ORAL
Status: DISCONTINUED | OUTPATIENT
Start: 2021-01-01 | End: 2021-01-01

## 2021-01-01 RX ORDER — IPRATROPIUM BROMIDE AND ALBUTEROL SULFATE 2.5; .5 MG/3ML; MG/3ML
3 SOLUTION RESPIRATORY (INHALATION)
Status: DISCONTINUED | OUTPATIENT
Start: 2021-01-01 | End: 2021-01-01 | Stop reason: HOSPADM

## 2021-01-01 RX ORDER — MORPHINE SULFATE 4 MG/ML
2-4 INJECTION, SOLUTION INTRAMUSCULAR; INTRAVENOUS EVERY 4 HOURS PRN
Status: DISCONTINUED | OUTPATIENT
Start: 2021-01-01 | End: 2021-01-01

## 2021-01-01 RX ORDER — HALOPERIDOL 5 MG/1
2.5 TABLET ORAL
COMMUNITY

## 2021-01-01 RX ORDER — MORPHINE SULFATE 4 MG/ML
2-4 INJECTION, SOLUTION INTRAMUSCULAR; INTRAVENOUS
Status: DISCONTINUED | OUTPATIENT
Start: 2021-01-01 | End: 2021-01-01

## 2021-01-01 RX ORDER — LORAZEPAM 2 MG/ML
1-2 INJECTION INTRAMUSCULAR
Status: DISCONTINUED | OUTPATIENT
Start: 2021-01-01 | End: 2021-01-01

## 2021-01-01 RX ORDER — CALCIUM CHLORIDE 100 MG/ML
1 INJECTION INTRAVENOUS; INTRAVENTRICULAR ONCE
Status: DISCONTINUED | OUTPATIENT
Start: 2021-01-01 | End: 2021-01-01

## 2021-01-01 RX ORDER — FUROSEMIDE 10 MG/ML
20 INJECTION INTRAMUSCULAR; INTRAVENOUS ONCE
Status: COMPLETED | OUTPATIENT
Start: 2021-01-01 | End: 2021-01-01

## 2021-01-01 RX ORDER — PHENYTOIN 125 MG/5ML
100 SUSPENSION ORAL 3 TIMES DAILY
Status: DISCONTINUED | OUTPATIENT
Start: 2021-01-01 | End: 2021-01-01

## 2021-01-01 RX ORDER — VECURONIUM BROMIDE 1 MG/ML
0.1 INJECTION, POWDER, LYOPHILIZED, FOR SOLUTION INTRAVENOUS ONCE
Status: DISCONTINUED | OUTPATIENT
Start: 2021-01-01 | End: 2021-01-01

## 2021-01-01 RX ORDER — DEXMEDETOMIDINE HYDROCHLORIDE 4 UG/ML
.1-1.5 INJECTION, SOLUTION INTRAVENOUS CONTINUOUS
Status: DISCONTINUED | OUTPATIENT
Start: 2021-01-01 | End: 2021-01-01

## 2021-01-01 RX ORDER — HEPARIN SODIUM 5000 [USP'U]/ML
5000 INJECTION, SOLUTION INTRAVENOUS; SUBCUTANEOUS EVERY 8 HOURS
Status: DISCONTINUED | OUTPATIENT
Start: 2021-01-01 | End: 2021-01-01 | Stop reason: HOSPADM

## 2021-01-01 RX ORDER — PROPOFOL 10 MG/ML
100-200 INJECTION, EMULSION INTRAVENOUS ONCE
Status: ACTIVE | OUTPATIENT
Start: 2021-01-01 | End: 2021-01-01

## 2021-01-01 RX ORDER — PREGABALIN 150 MG/1
150 CAPSULE ORAL 2 TIMES DAILY
COMMUNITY

## 2021-01-01 RX ORDER — PHENYTOIN 50 MG/1
200 TABLET, CHEWABLE ORAL EVERY 8 HOURS
Status: DISCONTINUED | OUTPATIENT
Start: 2021-01-01 | End: 2021-01-01

## 2021-01-01 RX ORDER — MIDAZOLAM HYDROCHLORIDE 1 MG/ML
5 INJECTION INTRAMUSCULAR; INTRAVENOUS
Status: DISCONTINUED | OUTPATIENT
Start: 2021-01-01 | End: 2021-01-01 | Stop reason: HOSPADM

## 2021-01-01 RX ORDER — AMOXICILLIN 250 MG
2 CAPSULE ORAL 2 TIMES DAILY
Status: DISCONTINUED | OUTPATIENT
Start: 2021-01-01 | End: 2021-01-01

## 2021-01-01 RX ADMIN — PIPERACILLIN AND TAZOBACTAM 4.5 G: 4; .5 INJECTION, POWDER, LYOPHILIZED, FOR SOLUTION INTRAVENOUS; PARENTERAL at 05:03

## 2021-01-01 RX ADMIN — DOCUSATE SODIUM 50 MG AND SENNOSIDES 8.6 MG 2 TABLET: 8.6; 5 TABLET, FILM COATED ORAL at 05:04

## 2021-01-01 RX ADMIN — PROPOFOL 80 MCG/KG/MIN: 10 INJECTION, EMULSION INTRAVENOUS at 05:40

## 2021-01-01 RX ADMIN — POTASSIUM PHOSPHATE, MONOBASIC AND POTASSIUM PHOSPHATE, DIBASIC 30 MMOL: 224; 236 INJECTION, SOLUTION, CONCENTRATE INTRAVENOUS at 08:42

## 2021-01-01 RX ADMIN — DOCUSATE SODIUM 50 MG AND SENNOSIDES 8.6 MG 2 TABLET: 8.6; 5 TABLET, FILM COATED ORAL at 05:27

## 2021-01-01 RX ADMIN — DEXMEDETOMIDINE HYDROCHLORIDE 1.5 MCG/KG/HR: 100 INJECTION, SOLUTION INTRAVENOUS at 03:16

## 2021-01-01 RX ADMIN — HEPARIN SODIUM 5000 UNITS: 5000 INJECTION, SOLUTION INTRAVENOUS; SUBCUTANEOUS at 13:55

## 2021-01-01 RX ADMIN — FENTANYL CITRATE 100 MCG: 50 INJECTION, SOLUTION INTRAMUSCULAR; INTRAVENOUS at 07:32

## 2021-01-01 RX ADMIN — ACETAMINOPHEN 650 MG: 325 TABLET, FILM COATED ORAL at 19:34

## 2021-01-01 RX ADMIN — PROPOFOL 70 MCG/KG/MIN: 10 INJECTION, EMULSION INTRAVENOUS at 07:41

## 2021-01-01 RX ADMIN — PROPOFOL 40 MCG/KG/MIN: 10 INJECTION, EMULSION INTRAVENOUS at 16:33

## 2021-01-01 RX ADMIN — Medication 400 MCG/HR: at 01:45

## 2021-01-01 RX ADMIN — LEVETIRACETAM 500 MG: 500 TABLET ORAL at 05:40

## 2021-01-01 RX ADMIN — PHENYTOIN SODIUM 200 MG: 50 INJECTION INTRAMUSCULAR; INTRAVENOUS at 06:00

## 2021-01-01 RX ADMIN — FAMOTIDINE 20 MG: 10 INJECTION INTRAVENOUS at 05:05

## 2021-01-01 RX ADMIN — PHENYTOIN SODIUM 200 MG: 50 INJECTION INTRAMUSCULAR; INTRAVENOUS at 21:42

## 2021-01-01 RX ADMIN — NOREPINEPHRINE BITARTRATE 14 MCG/MIN: 1 INJECTION, SOLUTION, CONCENTRATE INTRAVENOUS at 12:57

## 2021-01-01 RX ADMIN — PROPOFOL 80 MCG/KG/MIN: 10 INJECTION, EMULSION INTRAVENOUS at 04:29

## 2021-01-01 RX ADMIN — HALOPERIDOL LACTATE 1 MG: 5 INJECTION, SOLUTION INTRAMUSCULAR at 13:13

## 2021-01-01 RX ADMIN — PROPOFOL 50 MCG/KG/MIN: 10 INJECTION, EMULSION INTRAVENOUS at 02:37

## 2021-01-01 RX ADMIN — ENOXAPARIN SODIUM 40 MG: 40 INJECTION SUBCUTANEOUS at 05:27

## 2021-01-01 RX ADMIN — FAMOTIDINE 20 MG: 10 INJECTION INTRAVENOUS at 17:15

## 2021-01-01 RX ADMIN — DEXMEDETOMIDINE HYDROCHLORIDE 1.5 MCG/KG/HR: 100 INJECTION, SOLUTION INTRAVENOUS at 07:02

## 2021-01-01 RX ADMIN — LEVETIRACETAM 500 MG: 500 TABLET ORAL at 05:02

## 2021-01-01 RX ADMIN — ACETAMINOPHEN 650 MG: 325 TABLET, FILM COATED ORAL at 05:05

## 2021-01-01 RX ADMIN — LORAZEPAM 4 MG: 2 INJECTION INTRAMUSCULAR; INTRAVENOUS at 10:30

## 2021-01-01 RX ADMIN — MORPHINE SULFATE 4 MG: 4 INJECTION INTRAVENOUS at 22:21

## 2021-01-01 RX ADMIN — HALOPERIDOL 2 MG: 2 TABLET ORAL at 10:04

## 2021-01-01 RX ADMIN — POTASSIUM PHOSPHATE, MONOBASIC AND POTASSIUM PHOSPHATE, DIBASIC 15 MMOL: 224; 236 INJECTION, SOLUTION, CONCENTRATE INTRAVENOUS at 07:55

## 2021-01-01 RX ADMIN — PHENYTOIN SODIUM 200 MG: 50 INJECTION INTRAMUSCULAR; INTRAVENOUS at 13:43

## 2021-01-01 RX ADMIN — PROPOFOL 25 MCG/KG/MIN: 10 INJECTION, EMULSION INTRAVENOUS at 04:18

## 2021-01-01 RX ADMIN — PROPOFOL 80 MCG/KG/MIN: 10 INJECTION, EMULSION INTRAVENOUS at 07:52

## 2021-01-01 RX ADMIN — PROPOFOL 50 MCG/KG/MIN: 10 INJECTION, EMULSION INTRAVENOUS at 17:21

## 2021-01-01 RX ADMIN — PHENYTOIN 100 MG: 125 SUSPENSION ORAL at 05:30

## 2021-01-01 RX ADMIN — LEVETIRACETAM INJECTION 500 MG: 5 INJECTION INTRAVENOUS at 05:13

## 2021-01-01 RX ADMIN — PROPOFOL 65 MCG/KG/MIN: 10 INJECTION, EMULSION INTRAVENOUS at 21:42

## 2021-01-01 RX ADMIN — NOREPINEPHRINE BITARTRATE 18 MCG/MIN: 1 INJECTION, SOLUTION, CONCENTRATE INTRAVENOUS at 05:43

## 2021-01-01 RX ADMIN — DEXMEDETOMIDINE HYDROCHLORIDE 1 MCG/KG/HR: 100 INJECTION, SOLUTION INTRAVENOUS at 10:15

## 2021-01-01 RX ADMIN — FAMOTIDINE 20 MG: 10 INJECTION INTRAVENOUS at 05:39

## 2021-01-01 RX ADMIN — PIPERACILLIN AND TAZOBACTAM 4.5 G: 4; .5 INJECTION, POWDER, LYOPHILIZED, FOR SOLUTION INTRAVENOUS; PARENTERAL at 11:15

## 2021-01-01 RX ADMIN — HALOPERIDOL 2 MG: 2 TABLET ORAL at 17:21

## 2021-01-01 RX ADMIN — FENTANYL CITRATE 300 MCG/HR: 50 INJECTION, SOLUTION INTRAMUSCULAR; INTRAVENOUS at 14:21

## 2021-01-01 RX ADMIN — NOREPINEPHRINE BITARTRATE 30 MCG/MIN: 1 INJECTION, SOLUTION, CONCENTRATE INTRAVENOUS at 11:14

## 2021-01-01 RX ADMIN — HALOPERIDOL 2 MG: 2 TABLET ORAL at 05:02

## 2021-01-01 RX ADMIN — PHENYTOIN 200 MG: 125 SUSPENSION ORAL at 05:07

## 2021-01-01 RX ADMIN — ACETAMINOPHEN 650 MG: 325 TABLET ORAL at 04:17

## 2021-01-01 RX ADMIN — PROPOFOL 80 MCG/KG/MIN: 10 INJECTION, EMULSION INTRAVENOUS at 07:21

## 2021-01-01 RX ADMIN — HEPARIN SODIUM 5000 UNITS: 5000 INJECTION, SOLUTION INTRAVENOUS; SUBCUTANEOUS at 06:05

## 2021-01-01 RX ADMIN — FENTANYL CITRATE 100 MCG: 50 INJECTION, SOLUTION INTRAMUSCULAR; INTRAVENOUS at 04:08

## 2021-01-01 RX ADMIN — PHENYTOIN SODIUM 200 MG: 50 INJECTION INTRAMUSCULAR; INTRAVENOUS at 21:15

## 2021-01-01 RX ADMIN — NOREPINEPHRINE BITARTRATE 2 MCG/MIN: 1 INJECTION, SOLUTION, CONCENTRATE INTRAVENOUS at 15:15

## 2021-01-01 RX ADMIN — SODIUM CHLORIDE 250 ML: 9 INJECTION, SOLUTION INTRAVENOUS at 23:00

## 2021-01-01 RX ADMIN — LEVETIRACETAM 500 MG: 500 TABLET ORAL at 17:16

## 2021-01-01 RX ADMIN — Medication 1 EACH: at 11:00

## 2021-01-01 RX ADMIN — LEVETIRACETAM 500 MG: 500 TABLET ORAL at 17:15

## 2021-01-01 RX ADMIN — VASOPRESSIN 0.03 UNITS/MIN: 20 INJECTION INTRAVENOUS at 07:18

## 2021-01-01 RX ADMIN — MINERAL OIL, PETROLATUM 1 APPLICATION: 425; 573 OINTMENT OPHTHALMIC at 14:27

## 2021-01-01 RX ADMIN — PROPOFOL 60 MCG/KG/MIN: 10 INJECTION, EMULSION INTRAVENOUS at 14:36

## 2021-01-01 RX ADMIN — FENTANYL CITRATE 100 MCG: 50 INJECTION, SOLUTION INTRAMUSCULAR; INTRAVENOUS at 00:50

## 2021-01-01 RX ADMIN — ROCURONIUM BROMIDE 100 MG: 10 INJECTION, SOLUTION INTRAVENOUS at 01:35

## 2021-01-01 RX ADMIN — PHENYTOIN SODIUM 200 MG: 50 INJECTION INTRAMUSCULAR; INTRAVENOUS at 13:53

## 2021-01-01 RX ADMIN — ENOXAPARIN SODIUM 40 MG: 40 INJECTION SUBCUTANEOUS at 05:39

## 2021-01-01 RX ADMIN — HALOPERIDOL LACTATE 1 MG: 5 INJECTION, SOLUTION INTRAMUSCULAR at 05:04

## 2021-01-01 RX ADMIN — HYDROCORTISONE SODIUM SUCCINATE 100 MG: 100 INJECTION, POWDER, FOR SOLUTION INTRAMUSCULAR; INTRAVENOUS at 22:00

## 2021-01-01 RX ADMIN — DOCUSATE SODIUM 50 MG AND SENNOSIDES 8.6 MG 2 TABLET: 8.6; 5 TABLET, FILM COATED ORAL at 17:16

## 2021-01-01 RX ADMIN — PIPERACILLIN AND TAZOBACTAM 4.5 G: 4; .5 INJECTION, POWDER, LYOPHILIZED, FOR SOLUTION INTRAVENOUS; PARENTERAL at 05:00

## 2021-01-01 RX ADMIN — FENTANYL CITRATE 100 MCG: 50 INJECTION, SOLUTION INTRAMUSCULAR; INTRAVENOUS at 21:16

## 2021-01-01 RX ADMIN — LORAZEPAM 2 MG: 2 INJECTION INTRAMUSCULAR; INTRAVENOUS at 00:30

## 2021-01-01 RX ADMIN — LORAZEPAM 2 MG: 2 INJECTION INTRAMUSCULAR; INTRAVENOUS at 13:22

## 2021-01-01 RX ADMIN — FAMOTIDINE 20 MG: 10 INJECTION INTRAVENOUS at 05:13

## 2021-01-01 RX ADMIN — LORAZEPAM 2 MG: 2 INJECTION INTRAMUSCULAR; INTRAVENOUS at 19:54

## 2021-01-01 RX ADMIN — PROPOFOL 60 MCG/KG/MIN: 10 INJECTION, EMULSION INTRAVENOUS at 17:59

## 2021-01-01 RX ADMIN — SODIUM CHLORIDE, POTASSIUM CHLORIDE, SODIUM LACTATE AND CALCIUM CHLORIDE 1000 ML: 600; 310; 30; 20 INJECTION, SOLUTION INTRAVENOUS at 15:22

## 2021-01-01 RX ADMIN — LEVETIRACETAM INJECTION 500 MG: 5 INJECTION INTRAVENOUS at 12:28

## 2021-01-01 RX ADMIN — Medication 300 MCG: at 21:24

## 2021-01-01 RX ADMIN — LEVETIRACETAM INJECTION 500 MG: 5 INJECTION INTRAVENOUS at 05:30

## 2021-01-01 RX ADMIN — PHENYTOIN 200 MG: 125 SUSPENSION ORAL at 21:59

## 2021-01-01 RX ADMIN — PHENYTOIN SODIUM 200 MG: 50 INJECTION INTRAMUSCULAR; INTRAVENOUS at 21:50

## 2021-01-01 RX ADMIN — DOCUSATE SODIUM 50 MG AND SENNOSIDES 8.6 MG 2 TABLET: 8.6; 5 TABLET, FILM COATED ORAL at 05:05

## 2021-01-01 RX ADMIN — FAMOTIDINE 20 MG: 10 INJECTION INTRAVENOUS at 17:49

## 2021-01-01 RX ADMIN — HYDROCORTISONE SODIUM SUCCINATE 100 MG: 100 INJECTION, POWDER, FOR SOLUTION INTRAMUSCULAR; INTRAVENOUS at 15:22

## 2021-01-01 RX ADMIN — PROPOFOL 50 MCG/KG/MIN: 10 INJECTION, EMULSION INTRAVENOUS at 14:00

## 2021-01-01 RX ADMIN — Medication 300 MCG/HR: at 09:43

## 2021-01-01 RX ADMIN — HALOPERIDOL LACTATE 2.5 MG: 5 INJECTION, SOLUTION INTRAMUSCULAR at 18:14

## 2021-01-01 RX ADMIN — LORAZEPAM 2 MG: 2 INJECTION INTRAMUSCULAR; INTRAVENOUS at 02:19

## 2021-01-01 RX ADMIN — SODIUM CHLORIDE 250 ML: 9 INJECTION, SOLUTION INTRAVENOUS at 00:00

## 2021-01-01 RX ADMIN — PROPOFOL 50 MCG/KG/MIN: 10 INJECTION, EMULSION INTRAVENOUS at 08:05

## 2021-01-01 RX ADMIN — FENTANYL CITRATE 250 MCG: 50 INJECTION, SOLUTION INTRAMUSCULAR; INTRAVENOUS at 22:54

## 2021-01-01 RX ADMIN — DEXMEDETOMIDINE HYDROCHLORIDE 0.75 MCG/KG/HR: 100 INJECTION, SOLUTION INTRAVENOUS at 16:31

## 2021-01-01 RX ADMIN — PROPOFOL 50 MCG/KG/MIN: 10 INJECTION, EMULSION INTRAVENOUS at 21:41

## 2021-01-01 RX ADMIN — ENOXAPARIN SODIUM 40 MG: 40 INJECTION SUBCUTANEOUS at 05:04

## 2021-01-01 RX ADMIN — Medication 300 MCG/HR: at 14:39

## 2021-01-01 RX ADMIN — ENOXAPARIN SODIUM 40 MG: 40 INJECTION SUBCUTANEOUS at 05:13

## 2021-01-01 RX ADMIN — FENTANYL CITRATE 2500 MCG: 50 INJECTION, SOLUTION INTRAMUSCULAR; INTRAVENOUS at 07:22

## 2021-01-01 RX ADMIN — Medication 50 MCG: at 04:07

## 2021-01-01 RX ADMIN — PROPOFOL 80 MCG/KG/MIN: 10 INJECTION, EMULSION INTRAVENOUS at 13:33

## 2021-01-01 RX ADMIN — PROPOFOL 80 MCG/KG/MIN: 10 INJECTION, EMULSION INTRAVENOUS at 22:33

## 2021-01-01 RX ADMIN — PROPOFOL 60 MCG/KG/MIN: 10 INJECTION, EMULSION INTRAVENOUS at 05:15

## 2021-01-01 RX ADMIN — HALOPERIDOL 2 MG: 2 TABLET ORAL at 17:36

## 2021-01-01 RX ADMIN — Medication 100 MCG: at 10:53

## 2021-01-01 RX ADMIN — FAMOTIDINE 20 MG: 10 INJECTION INTRAVENOUS at 05:26

## 2021-01-01 RX ADMIN — FAMOTIDINE 20 MG: 10 INJECTION INTRAVENOUS at 06:05

## 2021-01-01 RX ADMIN — ROCURONIUM BROMIDE 60 MG: 10 INJECTION, SOLUTION INTRAVENOUS at 03:16

## 2021-01-01 RX ADMIN — PROPOFOL 80 MCG/KG/MIN: 10 INJECTION, EMULSION INTRAVENOUS at 01:59

## 2021-01-01 RX ADMIN — FENTANYL CITRATE 200 MCG/HR: 50 INJECTION, SOLUTION INTRAMUSCULAR; INTRAVENOUS at 12:50

## 2021-01-01 RX ADMIN — MIDAZOLAM HYDROCHLORIDE 5 MG: 1 INJECTION, SOLUTION INTRAMUSCULAR; INTRAVENOUS at 01:35

## 2021-01-01 RX ADMIN — PROPOFOL 80 MCG/KG/MIN: 10 INJECTION, EMULSION INTRAVENOUS at 16:57

## 2021-01-01 RX ADMIN — FENTANYL CITRATE 250 MCG: 50 INJECTION, SOLUTION INTRAMUSCULAR; INTRAVENOUS at 04:49

## 2021-01-01 RX ADMIN — PHENYLEPHRINE HYDROCHLORIDE 20 MCG/MIN: 10 INJECTION INTRAVENOUS at 10:10

## 2021-01-01 RX ADMIN — FENTANYL CITRATE 100 MCG: 50 INJECTION, SOLUTION INTRAMUSCULAR; INTRAVENOUS at 03:52

## 2021-01-01 RX ADMIN — NOREPINEPHRINE BITARTRATE 5 MCG/MIN: 1 INJECTION, SOLUTION, CONCENTRATE INTRAVENOUS at 18:01

## 2021-01-01 RX ADMIN — Medication 300 MCG/HR: at 04:04

## 2021-01-01 RX ADMIN — PROPOFOL 60 MCG/KG/MIN: 10 INJECTION, EMULSION INTRAVENOUS at 11:14

## 2021-01-01 RX ADMIN — LEVETIRACETAM INJECTION 500 MG: 5 INJECTION INTRAVENOUS at 21:50

## 2021-01-01 RX ADMIN — HALOPERIDOL 2 MG: 2 TABLET ORAL at 06:00

## 2021-01-01 RX ADMIN — LEVETIRACETAM INJECTION 500 MG: 5 INJECTION INTRAVENOUS at 05:04

## 2021-01-01 RX ADMIN — HALOPERIDOL 2 MG: 2 TABLET ORAL at 17:16

## 2021-01-01 RX ADMIN — Medication 400 MCG/HR: at 08:11

## 2021-01-01 RX ADMIN — DEXMEDETOMIDINE HYDROCHLORIDE 1 MCG/KG/HR: 100 INJECTION, SOLUTION INTRAVENOUS at 01:57

## 2021-01-01 RX ADMIN — DEXMEDETOMIDINE HYDROCHLORIDE 1.5 MCG/KG/HR: 100 INJECTION, SOLUTION INTRAVENOUS at 07:33

## 2021-01-01 RX ADMIN — PHENYTOIN SODIUM 200 MG: 50 INJECTION INTRAMUSCULAR; INTRAVENOUS at 14:38

## 2021-01-01 RX ADMIN — HYDROCORTISONE SODIUM SUCCINATE 100 MG: 100 INJECTION, POWDER, FOR SOLUTION INTRAMUSCULAR; INTRAVENOUS at 13:10

## 2021-01-01 RX ADMIN — DOCUSATE SODIUM 50 MG AND SENNOSIDES 8.6 MG 2 TABLET: 8.6; 5 TABLET, FILM COATED ORAL at 17:36

## 2021-01-01 RX ADMIN — NOREPINEPHRINE BITARTRATE 30 MCG/MIN: 1 INJECTION, SOLUTION, CONCENTRATE INTRAVENOUS at 08:52

## 2021-01-01 RX ADMIN — Medication 2500 MCG: at 17:43

## 2021-01-01 RX ADMIN — PHENYTOIN SODIUM 200 MG: 50 INJECTION INTRAMUSCULAR; INTRAVENOUS at 05:03

## 2021-01-01 RX ADMIN — LEVETIRACETAM 500 MG: 500 TABLET ORAL at 17:21

## 2021-01-01 RX ADMIN — ENOXAPARIN SODIUM 60 MG: 60 INJECTION SUBCUTANEOUS at 05:30

## 2021-01-01 RX ADMIN — VASOPRESSIN 0.03 UNITS/MIN: 20 INJECTION INTRAVENOUS at 13:24

## 2021-01-01 RX ADMIN — PROPOFOL 80 MCG/KG/MIN: 10 INJECTION, EMULSION INTRAVENOUS at 10:18

## 2021-01-01 RX ADMIN — VASOPRESSIN 0.03 UNITS/MIN: 20 INJECTION INTRAVENOUS at 11:14

## 2021-01-01 RX ADMIN — MORPHINE SULFATE 4 MG: 4 INJECTION INTRAVENOUS at 20:15

## 2021-01-01 RX ADMIN — PHENYTOIN SODIUM 200 MG: 50 INJECTION INTRAMUSCULAR; INTRAVENOUS at 05:14

## 2021-01-01 RX ADMIN — PHENYTOIN SODIUM 200 MG: 50 INJECTION INTRAMUSCULAR; INTRAVENOUS at 21:21

## 2021-01-01 RX ADMIN — LORAZEPAM 4 MG: 2 INJECTION INTRAMUSCULAR; INTRAVENOUS at 15:38

## 2021-01-01 RX ADMIN — LORAZEPAM 2 MG: 2 INJECTION INTRAMUSCULAR; INTRAVENOUS at 22:39

## 2021-01-01 RX ADMIN — FUROSEMIDE 20 MG: 10 INJECTION, SOLUTION INTRAMUSCULAR; INTRAVENOUS at 08:49

## 2021-01-01 RX ADMIN — LEVETIRACETAM 500 MG: 500 TABLET ORAL at 05:27

## 2021-01-01 RX ADMIN — MORPHINE SULFATE 4 MG: 4 INJECTION INTRAVENOUS at 05:04

## 2021-01-01 RX ADMIN — SODIUM BICARBONATE 50 MEQ: 84 INJECTION INTRAVENOUS at 10:23

## 2021-01-01 RX ADMIN — NOREPINEPHRINE BITARTRATE 14 MCG/MIN: 1 INJECTION, SOLUTION, CONCENTRATE INTRAVENOUS at 09:39

## 2021-01-01 RX ADMIN — Medication 400 MCG: at 01:34

## 2021-01-01 RX ADMIN — SODIUM CHLORIDE 1000 ML: 9 INJECTION, SOLUTION INTRAVENOUS at 11:15

## 2021-01-01 RX ADMIN — HALOPERIDOL 2 MG: 2 TABLET ORAL at 17:15

## 2021-01-01 RX ADMIN — HYDROCORTISONE SODIUM SUCCINATE 100 MG: 100 INJECTION, POWDER, FOR SOLUTION INTRAMUSCULAR; INTRAVENOUS at 05:03

## 2021-01-01 RX ADMIN — NYSTATIN 5 ML: 100000 SUSPENSION ORAL at 21:59

## 2021-01-01 RX ADMIN — PHENYTOIN SODIUM 200 MG: 50 INJECTION INTRAMUSCULAR; INTRAVENOUS at 14:20

## 2021-01-01 RX ADMIN — INSULIN HUMAN 10 UNITS: 100 INJECTION, SOLUTION PARENTERAL at 10:24

## 2021-01-01 RX ADMIN — VASOPRESSIN 0.03 UNITS/MIN: 20 INJECTION INTRAVENOUS at 17:59

## 2021-01-01 RX ADMIN — DEXMEDETOMIDINE HYDROCHLORIDE 0.8 MCG/KG/HR: 100 INJECTION, SOLUTION INTRAVENOUS at 13:07

## 2021-01-01 RX ADMIN — PROPOFOL 40 MCG/KG/MIN: 10 INJECTION, EMULSION INTRAVENOUS at 23:43

## 2021-01-01 RX ADMIN — CALCIUM CHLORIDE 1000 MG: 100 INJECTION, SOLUTION INTRAVENOUS at 10:24

## 2021-01-01 RX ADMIN — NOREPINEPHRINE BITARTRATE 30 MCG/MIN: 1 INJECTION, SOLUTION, CONCENTRATE INTRAVENOUS at 15:05

## 2021-01-01 RX ADMIN — Medication 400 MCG/HR: at 08:08

## 2021-01-01 RX ADMIN — LORAZEPAM 2 MG: 2 INJECTION INTRAMUSCULAR; INTRAVENOUS at 20:51

## 2021-01-01 RX ADMIN — PROPOFOL 60 MCG/KG/MIN: 10 INJECTION, EMULSION INTRAVENOUS at 10:33

## 2021-01-01 RX ADMIN — HALOPERIDOL LACTATE 1 MG: 5 INJECTION, SOLUTION INTRAMUSCULAR at 23:57

## 2021-01-01 RX ADMIN — FAMOTIDINE 20 MG: 10 INJECTION INTRAVENOUS at 17:36

## 2021-01-01 RX ADMIN — FENTANYL CITRATE 300 MCG/HR: 50 INJECTION, SOLUTION INTRAMUSCULAR; INTRAVENOUS at 13:11

## 2021-01-01 RX ADMIN — PHENYTOIN SODIUM 200 MG: 50 INJECTION INTRAMUSCULAR; INTRAVENOUS at 13:10

## 2021-01-01 RX ADMIN — LEVETIRACETAM INJECTION 500 MG: 5 INJECTION INTRAVENOUS at 17:36

## 2021-01-01 RX ADMIN — PROPOFOL 80 MCG/KG/MIN: 10 INJECTION, EMULSION INTRAVENOUS at 00:59

## 2021-01-01 RX ADMIN — HYDROCORTISONE SODIUM SUCCINATE 100 MG: 100 INJECTION, POWDER, FOR SOLUTION INTRAMUSCULAR; INTRAVENOUS at 21:41

## 2021-01-01 RX ADMIN — HYDROCORTISONE SODIUM SUCCINATE 100 MG: 100 INJECTION, POWDER, FOR SOLUTION INTRAMUSCULAR; INTRAVENOUS at 05:27

## 2021-01-01 RX ADMIN — NOREPINEPHRINE BITARTRATE 5 MCG/MIN: 1 INJECTION, SOLUTION, CONCENTRATE INTRAVENOUS at 14:10

## 2021-01-01 RX ADMIN — PROPOFOL 50 MCG/KG/MIN: 10 INJECTION, EMULSION INTRAVENOUS at 11:54

## 2021-01-01 RX ADMIN — MIDAZOLAM HYDROCHLORIDE 2 MG: 1 INJECTION, SOLUTION INTRAMUSCULAR; INTRAVENOUS at 10:02

## 2021-01-01 RX ADMIN — PIPERACILLIN AND TAZOBACTAM 4.5 G: 4; .5 INJECTION, POWDER, LYOPHILIZED, FOR SOLUTION INTRAVENOUS; PARENTERAL at 13:11

## 2021-01-01 RX ADMIN — DOCUSATE SODIUM 50 MG AND SENNOSIDES 8.6 MG 2 TABLET: 8.6; 5 TABLET, FILM COATED ORAL at 05:30

## 2021-01-01 RX ADMIN — PIPERACILLIN AND TAZOBACTAM 4.5 G: 4; .5 INJECTION, POWDER, LYOPHILIZED, FOR SOLUTION INTRAVENOUS; PARENTERAL at 21:41

## 2021-01-01 RX ADMIN — DEXTROSE MONOHYDRATE 50 ML: 25 INJECTION, SOLUTION INTRAVENOUS at 10:23

## 2021-01-01 RX ADMIN — PIPERACILLIN AND TAZOBACTAM 4.5 G: 4; .5 INJECTION, POWDER, LYOPHILIZED, FOR SOLUTION INTRAVENOUS; PARENTERAL at 14:21

## 2021-01-01 RX ADMIN — DEXMEDETOMIDINE HYDROCHLORIDE 1 MCG/KG/HR: 100 INJECTION, SOLUTION INTRAVENOUS at 19:33

## 2021-01-01 RX ADMIN — NOREPINEPHRINE BITARTRATE 20 MCG/MIN: 1 INJECTION, SOLUTION, CONCENTRATE INTRAVENOUS at 15:54

## 2021-01-01 RX ADMIN — PHENYTOIN 200 MG: 50 TABLET, CHEWABLE ORAL at 13:45

## 2021-01-01 RX ADMIN — PROPOFOL 80 MCG/KG/MIN: 10 INJECTION, EMULSION INTRAVENOUS at 05:05

## 2021-01-01 RX ADMIN — LEVETIRACETAM INJECTION 500 MG: 5 INJECTION INTRAVENOUS at 22:39

## 2021-01-01 RX ADMIN — VASOPRESSIN 0.03 UNITS/MIN: 20 INJECTION INTRAVENOUS at 14:17

## 2021-01-01 RX ADMIN — HALOPERIDOL 2 MG: 2 TABLET ORAL at 05:05

## 2021-01-01 RX ADMIN — PHENYTOIN SODIUM 200 MG: 50 INJECTION INTRAMUSCULAR; INTRAVENOUS at 05:04

## 2021-01-01 RX ADMIN — DOCUSATE SODIUM 50 MG AND SENNOSIDES 8.6 MG 2 TABLET: 8.6; 5 TABLET, FILM COATED ORAL at 05:13

## 2021-01-01 RX ADMIN — HALOPERIDOL 2 MG: 2 TABLET ORAL at 05:13

## 2021-01-01 RX ADMIN — ETOMIDATE 20 MG: 2 INJECTION INTRAVENOUS at 03:16

## 2021-01-01 RX ADMIN — PROPOFOL 80 MCG/KG/MIN: 10 INJECTION, EMULSION INTRAVENOUS at 19:26

## 2021-01-01 RX ADMIN — PIPERACILLIN AND TAZOBACTAM 4.5 G: 4; .5 INJECTION, POWDER, LYOPHILIZED, FOR SOLUTION INTRAVENOUS; PARENTERAL at 21:20

## 2021-01-01 RX ADMIN — VANCOMYCIN HYDROCHLORIDE 1500 MG: 500 INJECTION, POWDER, LYOPHILIZED, FOR SOLUTION INTRAVENOUS at 11:49

## 2021-01-01 RX ADMIN — DEXMEDETOMIDINE HYDROCHLORIDE 1.5 MCG/KG/HR: 100 INJECTION, SOLUTION INTRAVENOUS at 22:39

## 2021-01-01 RX ADMIN — PROPOFOL 80 MCG/KG/MIN: 10 INJECTION, EMULSION INTRAVENOUS at 04:14

## 2021-01-01 RX ADMIN — ENOXAPARIN SODIUM 60 MG: 60 INJECTION SUBCUTANEOUS at 18:14

## 2021-01-01 ASSESSMENT — PAIN DESCRIPTION - PAIN TYPE
TYPE: ACUTE PAIN
TYPE: ACUTE PAIN;CHRONIC PAIN
TYPE: ACUTE PAIN

## 2021-01-01 ASSESSMENT — FIBROSIS 4 INDEX
FIB4 SCORE: 2.41
FIB4 SCORE: 2.23
FIB4 SCORE: 2.07

## 2021-01-01 ASSESSMENT — PAIN SCALES - WONG BAKER
WONGBAKER_NUMERICALRESPONSE: HURTS JUST A LITTLE BIT
WONGBAKER_NUMERICALRESPONSE: DOESN'T HURT AT ALL

## 2021-01-13 PROBLEM — I10 ESSENTIAL HYPERTENSION: Status: ACTIVE | Noted: 2021-01-01

## 2021-01-13 PROBLEM — J12.82 PNEUMONIA DUE TO COVID-19 VIRUS: Status: ACTIVE | Noted: 2021-01-01

## 2021-01-13 PROBLEM — G40.909 SEIZURE DISORDER (HCC): Status: ACTIVE | Noted: 2021-01-01

## 2021-01-13 PROBLEM — J96.01 ACUTE RESPIRATORY FAILURE WITH HYPOXIA (HCC): Status: ACTIVE | Noted: 2021-01-01

## 2021-01-13 PROBLEM — U07.1 PNEUMONIA DUE TO COVID-19 VIRUS: Status: ACTIVE | Noted: 2021-01-01

## 2021-01-13 NOTE — PROCEDURES
Procedures  Procedure: right internal jugular central line insertion, us guided    : Dr Ernandez    Reason: Septic shock requiring vasopressors, covid 19 pneumonia, respiratory failure with hypoxia.    The patient's right neck region was prepped and draped in sterile fashion using chlorhexidine scrub. Anesthesia was achieved with 1% lidocaine. The right internal jugular vein was accessed under ultrasound guidance using a finder needle Venous blood was withdrawn and the needle was withdrawn after a guidewire was advanced through the needle catheter. A small incision was made with a blade scalpel and the needle was exchanged for a dilator over the guidewire until appropriate dilation was obtained. The dilator was removed and a central venous triple-lumen catheter was advanced over the guidewire and secured into place with 2 sutures at 14 cm. At time of procedure completion, all ports aspirated and flushed properly. Post-procedure x-ray pending.  Sterile procedure done throughout entire procedure by all participating.    Complications: none    Blood loss: < 3 cc

## 2021-01-13 NOTE — PROGRESS NOTES
Medication reconciliation completed via transfer paperwork.  Allergies updated.    Gisela Dye, PharmD, BCCCP

## 2021-01-13 NOTE — ASSESSMENT & PLAN NOTE
Secondary to severe COVID-19 pneumonia  Intubated 1/12  Extubated 1/13, reintubated 1/15    Ventilator dependent respiratory failure  Modify ventilator to optimize oxygenation and ventilation  HOB > 30  Chlorhexidine  Perform spontaneous breathing trial when able  Early mobility

## 2021-01-13 NOTE — PROGRESS NOTES
"Critical Care Progress Note    Date of admission  1/12/2021    Chief Complaint  65 y.o. male admitted 1/12/2021 with covid 19 pneumonia.  Multiple hospitalizations with remdesivir two courses and decadron.  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  Required intubation on transfer 1/12.    Per Dr Roman consult note: 65 y.o. male who presented 1/12/2021 as direct admission for worsening COVID-19 pneumonia and respiratory failure from [facility]; currently inpatient status since 1/3/2021 at [facility] [place].  Patient is a 65-year-old male, inmate at the correctional facility there, history of seizure disorder and remote IVDA and smoking history.  He presented just before [place] and subsequent acquired hospitalization and oxygen.  He was discharged back to the facility on January 2 but had to be immediately readmitted on January 3 with increasing respiratory distress, initially placed on CPAP.  He has been treated with 2 courses of remdesivir (11 total doses), IV corticosteroids, currently on Solu-Medrol 40 mg every 8 hours, 2 different courses of antibiotics currently discontinued and full dose anticoagulation since admission with full dose Lovenox for elevated D-dimer of 20, subsequently dropped to 2.5.  CT and chest x-rays showed bilateral alveolar infiltrates consistent with Covid pneumonia.  He has not developed renal failure or hepatic failure but has developed some \"Covid toes.\"  Over the past 2 to 3 days has had increasing oxygen requirements, currently on Vapotherm (HFNC) 40 L, 100% with oxygen desaturation to the mid 80s.  He initially declined intubation but now would accept a time-limited trial of intubation per report.  He was intubated for transport.      Hospital Course  No notes on file  1/12 intubated on transfer to this facility  1/13 on mechanical ventilator    Interval Problem Update  Reviewed last 24 hour events:  On mechanical ventilator  Fentanyl and propofol for sedation  Vasopressors for map > 65 " and sbp > 90  Leukocytosis  abg reviewed, adequate  cxray reviewed, not significant changes for covid    Addendum  Extubated, tolerated marginally, aggressive pulmonary toilet, high flow nc and nrb  Weaned off nrb, then weaned high flow to 45 L, 100%, then down to 35L 100% fio2  Multiple doses ativan/versed and on precedex drip  Hypotension requiring total 2 L IVF.  Bedside us meeting euvolemia vessels, chest b lines but ards/covid contributory in addition to likely some fluid  May need diuresis tonight  abg adequate after initial being hypoxic (? Venous) but sao2 also improved for second abg, now %  Patient does awaken with sedation weaning, multiple times today    Addendum  Central line placed, ok to use  For now ok to continue on high flow, high risk for need for intubation  precedex drip cont  Prn ativan and haldol  Once tolerates needs ng tube for oral seizure and anti-psychosis meds      Review of Systems  Review of Systems   Unable to perform ROS: Intubated        Vital Signs for last 24 hours   Temp:  [36.8 °C (98.2 °F)] 36.8 °C (98.2 °F)  Pulse:  [73-81] 81  Resp:  [4-35] 35  BP: ()/(55-64) 104/62  SpO2:  [87 %-98 %] 93 %    Hemodynamic parameters for last 24 hours       Respiratory Information for the last 24 hours  Vent Mode: APVCMV  Rate (breaths/min): 20  Vt Target (mL): 470  PEEP/CPAP: 8  MAP: 13    Physical Exam   Physical Exam  Vitals signs and nursing note reviewed.   Constitutional:       General: He is not in acute distress.     Appearance: He is ill-appearing. He is not toxic-appearing or diaphoretic.   HENT:      Head: Normocephalic and atraumatic.      Right Ear: External ear normal.      Left Ear: External ear normal.      Nose: No congestion or rhinorrhea.      Mouth/Throat:      Mouth: Mucous membranes are moist.      Pharynx: Oropharynx is clear. No oropharyngeal exudate or posterior oropharyngeal erythema.   Eyes:      General: No scleral icterus.        Right eye: No  discharge.         Left eye: No discharge.      Conjunctiva/sclera: Conjunctivae normal.      Pupils: Pupils are equal, round, and reactive to light.   Neck:      Musculoskeletal: Normal range of motion. No neck rigidity or muscular tenderness.   Cardiovascular:      Rate and Rhythm: Normal rate and regular rhythm.      Pulses: Normal pulses.      Heart sounds: Normal heart sounds. No murmur.   Pulmonary:      Effort: No respiratory distress.      Breath sounds: No stridor. No wheezing, rhonchi or rales.   Chest:      Chest wall: No tenderness.   Abdominal:      General: There is no distension.      Palpations: There is no mass.      Tenderness: There is no abdominal tenderness. There is no guarding.   Musculoskeletal:         General: No swelling, tenderness or deformity.      Right lower leg: No edema.      Left lower leg: No edema.   Lymphadenopathy:      Cervical: No cervical adenopathy.   Skin:     Coloration: Skin is not jaundiced or pale.      Findings: No bruising, erythema, lesion or rash.   Neurological:      General: No focal deficit present.      Mental Status: He is alert.      Cranial Nerves: No cranial nerve deficit.      Sensory: No sensory deficit.      Motor: No weakness.      Coordination: Coordination normal.      Gait: Gait normal.      Comments: Intubated and sedated         Medications  Current Facility-Administered Medications   Medication Dose Route Frequency Provider Last Rate Last Admin   • ondansetron (ZOFRAN) syringe/vial injection 4 mg  4 mg Intravenous Q6HRS PRN Fred Roman M.D.       • ondansetron (ZOFRAN ODT) dispertab 4 mg  4 mg Oral Q6HRS PRN Fred Roman M.D.       • labetalol (NORMODYNE/TRANDATE) injection 10 mg  10 mg Intravenous Q6HRS PRN Fred Roman M.D.       • Respiratory Therapy Consult   Nebulization Continuous RT Fred Roman M.D.       • ipratropium-albuterol (DUONEB) nebulizer solution  3 mL Nebulization Q2HRS PRN (RT) Fred Roman M.D.       •  famotidine (PEPCID) tablet 20 mg  20 mg Enteral Tube Q12HRS Fred Roman M.D.   Stopped at 01/13/21 0600    Or   • famotidine (PEPCID) injection 20 mg  20 mg Intravenous Q12HRS Fred Roman M.D.       • senna-docusate (PERICOLACE or SENOKOT S) 8.6-50 MG per tablet 2 Tab  2 Tab Enteral Tube BID Fred Roman M.D.   Stopped at 01/13/21 0600    And   • polyethylene glycol/lytes (MIRALAX) PACKET 1 Packet  1 Packet Enteral Tube QDAY PRN Fred Roman M.D.        And   • magnesium hydroxide (MILK OF MAGNESIA) suspension 30 mL  30 mL Enteral Tube QDAY PRN Fred Roman M.D.        And   • bisacodyl (DULCOLAX) suppository 10 mg  10 mg Rectal QDAY PRN Fred Roman M.D.       • MD Alert...ICU Electrolyte Replacement per Pharmacy   Other PHARMACY TO DOSE Fred Roman M.D.       • lidocaine (XYLOCAINE) 1 % injection 1-2 mL  1-2 mL Tracheal Tube Q30 MIN PRN Fred Roman M.D.       • Pharmacy Consult: Enteral tube insertion - review meds/change route/product selection  1 Each Other PHARMACY TO DOSE Fred Roman M.D.       • fentaNYL (SUBLIMAZE) injection 25 mcg  25 mcg Intravenous Q HOUR PRN Fred Roman M.D.        Or   • fentaNYL (SUBLIMAZE) injection 50 mcg  50 mcg Intravenous Q HOUR PRN Fred Roman M.D.        Or   • fentaNYL (SUBLIMAZE) injection 100 mcg  100 mcg Intravenous Q HOUR PRN Fred Roman M.D.   100 mcg at 01/13/21 0732   • fentaNYL (SUBLIMAZE) 50 mcg/mL in 50mL   Intravenous Continuous Fred Roman M.D. 2 mL/hr at 01/13/21 0742 100 mcg/hr at 01/13/21 0742   • enoxaparin (LOVENOX) inj 60 mg  1 mg/kg Subcutaneous Q12HRS Fred Roman M.D.       • phenytoin (DILANTIN) 100 MG/4ML suspension 100 mg  100 mg Per NG Tube TID Fred Roman M.D.       • acetaminophen (Tylenol) tablet 650 mg  650 mg Enteral Tube Q6HRS PRN Fred Roman M.D.       • propofol (DIPRIVAN) injection  0-80 mcg/kg/min Intravenous Continuous Fred Roman M.D. 30.2 mL/hr at 01/13/21 0728 80 mcg/kg/min at  01/13/21 0728       Fluids    Intake/Output Summary (Last 24 hours) at 1/13/2021 0752  Last data filed at 1/13/2021 0700  Gross per 24 hour   Intake 123.1 ml   Output --   Net 123.1 ml       Laboratory  Recent Labs     01/13/21  0109   ISTATAPH 7.341*   ISTATAPCO2 45.4*   ISTATAPO2 131*   ISTATATCO2 26   VTAJBJB8TDB 99   ISTATARTHCO3 24.6   ISTATARTBE -2   ISTATTEMP 97.6 F   ISTATFIO2 100   ISTATSPEC Arterial   ISTATAPHTC 7.349*   SFQMHLKM5QX 128*         Recent Labs     01/13/21  0629   SODIUM 132*   POTASSIUM 3.8   CHLORIDE 103   CO2 24   BUN 17   CREATININE 0.85   MAGNESIUM 2.4   PHOSPHORUS 2.7   CALCIUM 7.8*     Recent Labs     01/13/21  0629   ALTSGPT 35   ASTSGOT 56*   ALKPHOSPHAT 62   TBILIRUBIN 0.4   GLUCOSE 95     Recent Labs     01/13/21  0629   WBC 15.5*   NEUTSPOLYS 88.60*   LYMPHOCYTES 4.50*   MONOCYTES 3.80   EOSINOPHILS 1.30   BASOPHILS 0.30   ASTSGOT 56*   ALTSGPT 35   ALKPHOSPHAT 62   TBILIRUBIN 0.4     Recent Labs     01/13/21  0629   RBC 4.71   HEMOGLOBIN 14.3   HEMATOCRIT 43.4   PLATELETCT 297       Imaging  X-Ray:  I have personally reviewed the images and compared with prior images.  EKG:  I have personally reviewed the images and compared with prior images.    Assessment/Plan  * Acute respiratory failure with hypoxia (HCC)  Assessment & Plan  Secondary to COVID-19 pneumonia  Progressive hypoxia failing HFNC and intubated 1/12 prior to transport  Continue full ventilator support, LTV/LPS, wean as tolerated, all ventilator bundles in place  Follow-up chest imaging adequate    Pneumonia due to COVID-19 virus  Assessment & Plan  Initially COVID-19 +12/24-rain  Completed 2 courses of remdesivir (11 doses)  Remains on corticosteroids, currently methylprednisolone 40 3 times daily  Follow-up chest imaging, monitor for secondary infections  Completed 2 course of antibiotics initially with C3/azithromycin, then levofloxacin  We will repeat sputum culture, MRSA nares  Full dose anticoagulation for  COVID-19 pneumonia with high D-dimer 20 on admission    Essential hypertension  Assessment & Plan  BP control    Seizure disorder (HCC)  Assessment & Plan  Continue phenytoin       VTE:  Lovenox  Ulcer: H2 Antagonist  Lines: Huang Catheter  Ongoing indication addressed    I have performed a physical exam and reviewed and updated ROS and Plan today (1/13/2021). In review of yesterday's note (1/12/2021), there are no changes except as documented above.     Discussed patient condition and risk of morbidity and/or mortality with RN, RT, Pharmacy, Code status disscussed and Charge nurse / hot rounds     The patient remains critically ill.  On mechanical ventilator. Propofol and fentanyl drips for sedation.  Vasopressors for map > 65 and sbp > 90.  SAT and SBT, extubated, marginally tolerated.  Multiple reassessments and put on precedex drip, prn haldol and benzodiazepines. Multiple abg reviewed.  High risk for re-intubation for mechanical ventilator.  Critical care time = 110 minutes in directly providing and coordinating critical care and extensive data review.  No time overlap and excludes procedures.

## 2021-01-13 NOTE — DIETARY
"Nutrition Support Assessment:  Day 1 of admit.  Nicki Monreal is a 65 y.o. male with admitting DX of COVID Pneumonia; Respiratory Failure; Acute hypoxemic respiratory failure due to COVID-19 (HCC)     Current problem list:  Transfer call summary 1/12: Direct admission request for Covid positive patient with respiratory failure from [facility]; currently inpatient status since 1/3/2021 at [facility].  Patient is a 65-year-old male, inmate at the correctional facility there, history of seizure disorder and remote IVDA and smoking history.  He presented just before [place] and subsequent acquired hospitalization and oxygen.  He was discharged back to the facility on January 2 but had to be immediately readmitted on January 3 with increasing respiratory distress, initially placed on CPAP.  He has been treated with 2 courses of remdesivir (11 total doses), IV corticosteroids, currently on Solu-Medrol 40 mg every 8 hours, 2 different courses of antibiotics currently discontinued and full dose anticoagulation since admission with full dose Lovenox for elevated D-dimer of 20, subsequently dropped to 2.5.  CT and chest x-rays showed bilateral alveolar infiltrates consistent with Covid pneumonia.  He has not developed renal failure or hepatic failure but has developed some \"Covid toes.\"  Over the past 2 to 3 days has had increasing oxygen requirements, currently on Vapotherm (HFNC) 40 L, 100% with oxygen desaturation to the mid 80s.  He is in some increasing respiratory distress and may require intubation.  He initially declined intubation but now would accept a time-limited trial of intubation per report. The patient cannot be transported on high flow nasal cannula and was desaturating without this.  In discussion with his attending physician he will require intubation prior to transport.     Assessment:  Estimated Nutritional Needs based on:   Height: 180 cm (5' 10.87\") (copied from prior entry)  Weight: " 62.9 kg (138 lb 10.7 oz) (copied from prior entry)  Ideal Body Weight: 77.7 kg (171 lb 3.2 oz)  Percent Ideal Body Weight: 81  Body mass index is 19.41 kg/m²., BMI classification: Normal (low-end)    Calculation/Equation: REE per MSJ x1.2 = 1722 kcal/day; RMR per PSU (vent L/min 11.4, T max/24 hours 36.8) = 1664 kcal/day  Total Calories / day: 1575 - 1885 (Calories / k - 30)  Total Grams Protein / day: 75 - 129 (Grams Protein / k.2 - 2.0)     Evaluation:   1. Pt is intubated and unable to take PO diet.  2. Orders to start TF once enteral tube is in place.  3. Did not adjust feeding recommendations based on COVID-19 Dx, as pt has had this for several weeks now.  4. Labs reviewed.   5. Meds include electrolyte replacement protocol, PAULINA @ 100 mcg, and bowel regimen. Propofol D/c'd this morning per MAR.   6. No BM on record.  7. No wounds noted.  8. Standard TF formula can meet nutritional needs. Will opt to feed higher-end of protein needs based on low body weight and infection.     Malnutrition Risk: None identified @ this time, but pt is underweight based on ideal body weight.      Recommendations/Plan:  1. Start Replete with Fiber @ 25 ml/hr and advance per protocol to goal rate 70 ml/hr to provide 1680 kcal, 108 grams protein, and 1394 ml free water per day.  2. Fluids per MD. HOANG following.

## 2021-01-13 NOTE — CONSULTS
"Critical Care Consultation  (H&P)    Date of consult: 1/12/2021    Referring Physician  Fred Roman M.D.    Reason for Consultation  Progressive COVID-19 pneumonia, acute respiratory failure, intubated 1/12 for transport    History of Presenting Illness  65 y.o. male who presented 1/12/2021 as direct admission for worsening COVID-19 pneumonia and respiratory failure from [facility]; currently inpatient status since 1/3/2021 at [facility].  Patient is a 65-year-old male, inmate at the correctional facility there, history of seizure disorder and remote IVDA and smoking history.  He presented just before [place] and subsequent acquired hospitalization and oxygen.  He was discharged back to the facility on January 2 but had to be immediately readmitted on January 3 with increasing respiratory distress, initially placed on CPAP.  He has been treated with 2 courses of remdesivir (11 total doses), IV corticosteroids, currently on Solu-Medrol 40 mg every 8 hours, 2 different courses of antibiotics currently discontinued and full dose anticoagulation since admission with full dose Lovenox for elevated D-dimer of 20, subsequently dropped to 2.5.  CT and chest x-rays showed bilateral alveolar infiltrates consistent with Covid pneumonia.  He has not developed renal failure or hepatic failure but has developed some \"Covid toes.\"  Over the past 2 to 3 days has had increasing oxygen requirements, currently on Vapotherm (HFNC) 40 L, 100% with oxygen desaturation to the mid 80s.  He initially declined intubation but now would accept a time-limited trial of intubation per report.  He was intubated for transport.    Code Status  No Order    Review of Systems  Review of Systems   Unable to perform ROS: Intubated       Past Medical History  Hypertension, seizure disorder, hyperlipidemia    Surgical History  Appendectomy, cholecystectomy    Family History  family history is not on file.    Social History   Former " smoker with cessation 15 years ago, prior history of IVDA per report    Medications  Current medications: Enoxaparin 75 mg subcu twice daily, guaifenesin, albuterol, methylprednisolone 40 mg 3 times daily, Dilantin  mg twice daily, aspirin 81 mg daily, morphine as needed    Allergies  Codeine, penicillins, sulfa antibiotics, fish derived products    Vital Signs last 24 hours  Pulse:  [77] 77  Resp:  [18] 18  SpO2:  [97 %] 97 %    Physical Exam  Physical Exam  Vitals signs and nursing note reviewed.   Constitutional:       Comments: Intubated, sedated,   HENT:      Head: Normocephalic.      Nose: Nose normal.      Mouth/Throat:      Comments: ETT in place  Cardiovascular:      Rate and Rhythm: Normal rate and regular rhythm.      Comments: Diminished pulses DP and PT bilaterally  Pulmonary:      Comments: Intubated, full ventilator support, scattered coarse breath sounds, diminished, no wheezing  Abdominal:      General: There is no distension.      Tenderness: There is no abdominal tenderness.   Musculoskeletal:         General: No swelling or deformity.   Skin:     General: Skin is warm and dry.      Capillary Refill: Capillary refill takes 2 to 3 seconds.      Comments: Multiple tattoos.  Purple discoloration right first second and third toes, lesser extent left great toe   Neurological:      Comments: Sedated, moves all extremities spontaneously, not following commands currently         Fluids  No intake or output data in the 24 hours ending 01/12/21 7337    Laboratory  No results found for this or any previous visit (from the past 48 hour(s)).   Labs at OSH:  WBC 14.9, hemoglobin 17.5, platelets 350 7K, sodium 137, potassium 3.7, chloride 103, bicarb 22, glucose 86, BUN 19, creatinine 0.69, AST 97, ALT 53, bilirubin 0.6, albumin 2.8, D-dimer 2.54, CRP 7.8, lactic acid 1.6      Imaging  No orders to display        Studies at OSH:   CT head negative  Arterial ultrasound bilateral lower extremities no  significant hemodynamic stenosis, proximal triphasic and distal biphasic waveforms with monophasic waveform left posterior tibial artery and left dorsalis pedis artery, question occlusion or embolic phenomena  Venous Doppler bilateral lower extremities: No DVT  CXR: Bilateral pulmonary infiltrates, no pneumothorax    Assessment/Plan  * Acute respiratory failure with hypoxia (HCC)  Assessment & Plan  Secondary to COVID-19 pneumonia  Progressive hypoxia failing HFNC and intubated 1/12 prior to transport  Continue full ventilator support, LTV/LPS, wean as tolerated, all ventilator bundles in place  Follow-up chest imaging    Essential hypertension  Assessment & Plan  BP control    Seizure disorder (HCC)  Assessment & Plan  Continue phenytoin    Pneumonia due to COVID-19 virus  Assessment & Plan  Initially COVID-19 +12/24-rain  Completed 2 courses of remdesivir (11 doses)  Remains on corticosteroids, currently methylprednisolone 40 3 times daily  Follow-up chest imaging, monitor for secondary infections  Completed 2 course of antibiotics initially with C3/azithromycin, then levofloxacin  We will repeat sputum culture, MRSA nares  Full dose anticoagulation for COVID-19 pneumonia with high D-dimer 20 on admission      Discussed patient condition and risk of morbidity and/or mortality with RN, RT and Pharmacy.    The patient remains critically ill.  Critical care time = 45 minutes in directly providing and coordinating critical care and extensive data review.  No time overlap and excludes procedures.

## 2021-01-13 NOTE — PROGRESS NOTES
"Transfer call summary:    Direct admission request for Covid positive patient with respiratory failure from [facility]; currently inpatient status since 1/3/2021 at [facility].  Patient is a 65-year-old male, inmate at the correctional facility there, history of seizure disorder and remote IVDA and smoking history.  He presented just before [place] and subsequent acquired hospitalization and oxygen.  He was discharged back to the facility on January 2 but had to be immediately readmitted on January 3 with increasing respiratory distress, initially placed on CPAP.  He has been treated with 2 courses of remdesivir (11 total doses), IV corticosteroids, currently on Solu-Medrol 40 mg every 8 hours, 2 different courses of antibiotics currently discontinued and full dose anticoagulation since admission with full dose Lovenox for elevated D-dimer of 20, subsequently dropped to 2.5.  CT and chest x-rays showed bilateral alveolar infiltrates consistent with Covid pneumonia.  He has not developed renal failure or hepatic failure but has developed some \"Covid toes.\"  Over the past 2 to 3 days has had increasing oxygen requirements, currently on Vapotherm (HFNC) 40 L, 100% with oxygen desaturation to the mid 80s.  He is in some increasing respiratory distress and may require intubation.  He initially declined intubation but now would accept a time-limited trial of intubation per report.    The patient cannot be transported on high flow nasal cannula and was desaturating without this.  In discussion with his attending physician he will require intubation prior to transport.    I have accepted the patient for admission to our intensive care unit at [facility].    Admission order placed.  Please contact on-call intensivist when patient arrives for further orders.        "

## 2021-01-13 NOTE — PROGRESS NOTES
Patient was received by flight team at 0030 on 1/13/21. Assessment was completed and new IVs were started. Epic was not available for charting. Charting from NOC shift will be completed next NOC shift.

## 2021-01-14 PROBLEM — G93.40 ACUTE ENCEPHALOPATHY: Status: ACTIVE | Noted: 2021-01-01

## 2021-01-14 NOTE — PROGRESS NOTES
Cortrak Placement    Tube Team verified patient name and medical record number prior to tube placement.  Cortrak tube (43 inches, 10 Finnish) placed at 70 cm in left nare.  Per Cortrak picture, tube appears to be in the small bowel.  Nursing Instructions: Awaiting KUB to confirm placement before use for medications or feeding. Once placement confirmed, flush tube with 30 ml of water, and then remove and save stylet, in patient medication drawer.

## 2021-01-14 NOTE — PROGRESS NOTES
"Critical Care Progress Note    Date of admission  1/12/2021    Chief Complaint  65 y.o. male admitted 1/12/2021 with covid 19 pneumonia.  Multiple hospitalizations with remdesivir two courses and decadron.  Prisoner from [place], [place].  Required intubation on transfer 1/12.    Per Dr Roman consult note: 65 y.o. male who presented 1/12/2021 as direct admission for worsening COVID-19 pneumonia and respiratory failure from [facility]; currently inpatient status since 1/3/2021 at [facility].  Patient is a 65-year-old male, inmate at the correctional facility there, history of seizure disorder and remote IVDA and smoking history.  He presented just before [place] and subsequent acquired hospitalization and oxygen.  He was discharged back to the facility on January 2 but had to be immediately readmitted on January 3 with increasing respiratory distress, initially placed on CPAP.  He has been treated with 2 courses of remdesivir (11 total doses), IV corticosteroids, currently on Solu-Medrol 40 mg every 8 hours, 2 different courses of antibiotics currently discontinued and full dose anticoagulation since admission with full dose Lovenox for elevated D-dimer of 20, subsequently dropped to 2.5.  CT and chest x-rays showed bilateral alveolar infiltrates consistent with Covid pneumonia.  He has not developed renal failure or hepatic failure but has developed some \"Covid toes.\"  Over the past 2 to 3 days has had increasing oxygen requirements, currently on Vapotherm (HFNC) 40 L, 100% with oxygen desaturation to the mid 80s.  He initially declined intubation but now would accept a time-limited trial of intubation per report.  He was intubated for transport.      Hospital Course  No notes on file  1/12 intubated on transfer to this facility  1/13 on mechanical ventilator    Interval Problem Update  Reviewed last 24 hour events:  50 L 100% fio2, sao2 % if sedated, without sedation 70-80%  precedex drip  Ativan " prn  Scheduled haldol  Repeat ekg  Trial of diuresis  Levophed for map > 65 and sbp > 90  Morphine prn  cxray continued infiltrates, some bowel gas    Review of Systems  Review of Systems   Unable to perform ROS: Intubated        Vital Signs for last 24 hours   Temp:  [36.5 °C (97.7 °F)-37.3 °C (99.1 °F)] 37.3 °C (99.1 °F)  Pulse:  [] 69  Resp:  [19-39] 28  BP: ()/(37-73) 100/53  SpO2:  [54 %-100 %] 98 %    Hemodynamic parameters for last 24 hours       Respiratory Information for the last 24 hours       Physical Exam   Physical Exam  Vitals signs and nursing note reviewed.   Constitutional:       General: He is not in acute distress.     Appearance: He is ill-appearing. He is not toxic-appearing or diaphoretic.   HENT:      Head: Normocephalic and atraumatic.      Right Ear: External ear normal.      Left Ear: External ear normal.      Nose: No congestion or rhinorrhea.      Mouth/Throat:      Mouth: Mucous membranes are moist.      Pharynx: Oropharynx is clear. No oropharyngeal exudate or posterior oropharyngeal erythema.   Eyes:      General: No scleral icterus.        Right eye: No discharge.         Left eye: No discharge.      Conjunctiva/sclera: Conjunctivae normal.      Pupils: Pupils are equal, round, and reactive to light.   Neck:      Musculoskeletal: Normal range of motion. No neck rigidity or muscular tenderness.   Cardiovascular:      Rate and Rhythm: Normal rate and regular rhythm.      Pulses: Normal pulses.      Heart sounds: Normal heart sounds. No murmur.   Pulmonary:      Effort: No respiratory distress.      Breath sounds: No stridor. No wheezing, rhonchi or rales.   Chest:      Chest wall: No tenderness.   Abdominal:      General: There is no distension.      Palpations: There is no mass.      Tenderness: There is no abdominal tenderness. There is no guarding.   Musculoskeletal:         General: No swelling, tenderness or deformity.      Right lower leg: No edema.      Left lower  leg: No edema.   Lymphadenopathy:      Cervical: No cervical adenopathy.   Skin:     Coloration: Skin is not jaundiced or pale.      Findings: No bruising, erythema, lesion or rash.   Neurological:      General: No focal deficit present.      Mental Status: He is alert.      Cranial Nerves: No cranial nerve deficit.      Sensory: No sensory deficit.      Motor: No weakness.      Coordination: Coordination normal.      Gait: Gait normal.      Comments: Intubated and sedated         Medications  Current Facility-Administered Medications   Medication Dose Route Frequency Provider Last Rate Last Admin   • Pharmacy Consult: Enteral tube insertion - review meds/change route/product selection   Other PHARMACY TO DOSE Orestes Ernandez M.D.       • ondansetron (ZOFRAN ODT) dispertab 4 mg  4 mg Enteral Tube Q6HRS PRN Orestes Ernandez M.D.       • potassium phosphate 15 mmol in  mL ivpb  15 mmol Intravenous Once Orestes Ernandez M.D.       • haloperidol lactate (HALDOL) injection 1 mg  1 mg Intravenous Q4HRS PRN Orestes Ernandez M.D.       • ondansetron (ZOFRAN) syringe/vial injection 4 mg  4 mg Intravenous Q6HRS PRN Fred Roman M.D.       • labetalol (NORMODYNE/TRANDATE) injection 10 mg  10 mg Intravenous Q6HRS PRN Fred Roman M.D.       • Respiratory Therapy Consult   Nebulization Continuous RT Fred Roman M.D.       • ipratropium-albuterol (DUONEB) nebulizer solution  3 mL Nebulization Q2HRS PRN (RT) Fred Roman M.D.       • senna-docusate (PERICOLACE or SENOKOT S) 8.6-50 MG per tablet 2 Tab  2 Tab Enteral Tube BID Fred Roman M.D.   2 Tab at 01/14/21 0530    And   • polyethylene glycol/lytes (MIRALAX) PACKET 1 Packet  1 Packet Enteral Tube QDAY PRN Fred Roman M.D.        And   • magnesium hydroxide (MILK OF MAGNESIA) suspension 30 mL  30 mL Enteral Tube QDAY PRN Fred Roman M.D.        And   • bisacodyl (DULCOLAX) suppository 10 mg  10 mg Rectal QDAY PRN Fred Roman M.D.       • MD  Alert...ICU Electrolyte Replacement per Pharmacy   Other PHARMACY TO DOSE Fred Roman M.D.       • enoxaparin (LOVENOX) inj 60 mg  1 mg/kg Subcutaneous Q12HRS Fred Roman M.D.   60 mg at 01/14/21 0530   • phenytoin (DILANTIN) 100 MG/4ML suspension 100 mg  100 mg Per NG Tube TID Fred Roman M.D.   100 mg at 01/14/21 0530   • acetaminophen (Tylenol) tablet 650 mg  650 mg Enteral Tube Q6HRS PRN Fred Roman M.D.       • dexmedetomidine (PRECEDEX) 400 mcg/100mL NS premix infusion  0.1-1.5 mcg/kg/hr Intravenous Continuous Orestes Ernandez M.D. 23.6 mL/hr at 01/14/21 0733 1.5 mcg/kg/hr at 01/14/21 0733   • levETIRAcetam (Keppra) 500 mg in 100 mL NaCl IV premix  500 mg Intravenous Q12HRS Orestes Ernandez M.D.   Stopped at 01/14/21 0545   • norepinephrine (Levophed) infusion 8 mg/250 mL (premix)  0-30 mcg/min Intravenous Continuous Orestes Ernandez M.D. 9.4 mL/hr at 01/14/21 0645 5 mcg/min at 01/14/21 0645   • LORazepam (ATIVAN) injection 1-2 mg  1-2 mg Intravenous Q2HRS PRN Orestes Ernandez M.D.   2 mg at 01/13/21 2239   • morphine (pf) 4 mg/mL injection 2-4 mg  2-4 mg Intravenous Q2HRS PRN Fred Roman M.D.   4 mg at 01/14/21 0504       Fluids    Intake/Output Summary (Last 24 hours) at 1/14/2021 0734  Last data filed at 1/14/2021 0645  Gross per 24 hour   Intake 2196.65 ml   Output 885 ml   Net 1311.65 ml       Laboratory  Recent Labs     01/13/21  0109 01/13/21  1016 01/13/21  1316   ISTATAPH 7.341* 7.414 7.400   ISTATAPCO2 45.4* 36.5 36.6   ISTATAPO2 131* 38* 89*   ISTATATCO2 26 24 24   [id number] 99 74* 97   ISTATARTHCO3 24.6 23.4 22.7   ISTATARTBE -2 -1 -2   ISTATTEMP 97.6 F 98.2 F 98.8 F   ISTATFIO2 100 100 100   ISTATSPEC Arterial Arterial Arterial   ISTATAPHTC 7.349* 7.418 7.399*   [id number] 128* 38* 90*         Recent Labs     01/13/21  0629 01/14/21  0544   SODIUM 132* 135   POTASSIUM 3.8 3.9   CHLORIDE 103 106   CO2 24 21   BUN 17 16   CREATININE 0.85 0.64   MAGNESIUM 2.4 2.1   PHOSPHORUS 2.7 2.2*    CALCIUM 7.8* 7.6*     Recent Labs     01/13/21  0629 01/14/21  0544   ALTSGPT 35  --    ASTSGOT 56*  --    ALKPHOSPHAT 62  --    TBILIRUBIN 0.4  --    PREALBUMIN  --  12.0*   GLUCOSE 95 94     Recent Labs     01/13/21  0629 01/14/21  0544   WBC 15.5* 16.3*   NEUTSPOLYS 88.60* 90.30*   LYMPHOCYTES 4.50* 3.40*   MONOCYTES 3.80 3.90   EOSINOPHILS 1.30 0.70   BASOPHILS 0.30 0.30   ASTSGOT 56*  --    ALTSGPT 35  --    ALKPHOSPHAT 62  --    TBILIRUBIN 0.4  --      Recent Labs     01/13/21  0629 01/14/21  0544   RBC 4.71 4.28*   HEMOGLOBIN 14.3 13.3*   HEMATOCRIT 43.4 39.8*   PLATELETCT 297 255       Imaging  X-Ray:  I have personally reviewed the images and compared with prior images.  EKG:  I have personally reviewed the images and compared with prior images.    Assessment/Plan  * Acute respiratory failure with hypoxia (HCC)  Assessment & Plan  Secondary to COVID-19 pneumonia  Progressive hypoxia failing HFNC and intubated 1/12 prior to transport  Continue full ventilator support, LTV/LPS, wean as tolerated, all ventilator bundles in place  Follow-up chest imaging adequate  Extubated 1/13, high flow nc  High risk re-intubation    Pneumonia due to COVID-19 virus  Assessment & Plan  Initially COVID-19 +12/24-rain  Completed 2 courses of remdesivir (11 doses)  Remains on corticosteroids, currently methylprednisolone 40 3 times daily  Follow-up chest imaging, monitor for secondary infections  Completed 2 course of antibiotics initially with C3/azithromycin, then levofloxacin  We will repeat sputum culture, MRSA nares  Full dose anticoagulation for COVID-19 pneumonia with high D-dimer 20 on admission    Acute encephalopathy  Assessment & Plan  Secondary to hypoxia  Complicated by underlying psychosis  Scheduled haldol, change to oral from outpt dosing  precedex drip  No focal deficits  Prn ativan  qtc prolonged, 510    Essential hypertension  Assessment & Plan  BP control    Seizure disorder (HCC)  Assessment &  Plan  Continue phenytoin       VTE:  Lovenox  Ulcer: H2 Antagonist  Lines: Huang Catheter  Ongoing indication addressed    I have performed a physical exam and reviewed and updated ROS and Plan today (1/14/2021). In review of yesterday's note (1/13/2021), there are no changes except as documented above.     Discussed patient condition and risk of morbidity and/or mortality with RN, RT, Pharmacy, Code status disscussed and Charge nurse / hot rounds     The patient remains critically ill.  High flow nasal cannula 50 L 100%, fio2 % if sedated, without sedation 70-80.  Precedex drip and prn ativan and morphine with scheduled haldol for sedation.  Wean precedex as tolerated. Levophed for map > 65 and sbp > 90.  High risk for re-intubation for mechanical ventilator.  Critical care time = 55 minutes in directly providing and coordinating critical care and extensive data review.  No time overlap and excludes procedures.

## 2021-01-14 NOTE — ASSESSMENT & PLAN NOTE
Secondary to hypoxia  Complicated by underlying psychosis  No focal deficits  qtc prolonged, 510 initial, decreased  Scheduled haldol bid, on daily at home  Required re intubation

## 2021-01-15 NOTE — DIETARY
Nutrition Services: Day 3 of admit.  Nicki Monreal is a 65 y.o. male with admitting DX of COVID Pneumonia; Respiratory Failure; Acute hypoxemic respiratory failure due to COVID-19 (HCC)    Change in TF regimen is indicated: Pt now on max-dose propofol and 2 vasopressors.    Current TF order: Replete Fiber, goal rate 70 ml/hr to provide 1680 kcal, 108 grams protein, and 1394 ml free water per day.  TF has not yet reached goal, currently infusing @ 55 ml/hr.    Estimated needs per RD assessment :  Calculation/Equation: REE per MSJ x1.2 = 1722 kcal/day; RMR per [facility] (vent L/min 11.4, T max/24 hours 36.8) = 1664 kcal/day  Total Calories / day: 1575 - 1885 (Calories / k - 30)  Total Grams Protein / day: 75 - 129 (Grams Protein / k.2 - 2.0)      Evaluation:   1. Labs reviewed.   2. Meds include electrolyte replacement protocol, levophed @ 10 mcg/min (decreasing), propofol @ 80 mcg/kg/min (30 ml/hr = 792 kcal/day), bowel regimen, and vasopressin @ 0.03 U/min (new).   3. No BM on record. Pink lady enema given today @ 1100 per MAR.   4. No wounds noted.  5. Standard TF formula is no longer appropriate. Will change to fiber-free, high-protein regimen.     Malnutrition Risk: None identified @ this time, but pt is underweight based on ideal body weight.      Recommendations/Plan:  1. Change TF to Impact Peptide 1.5. Goal rate with propofol: 35 ml/hr to provide 1260 kcal (+Kcal from propofol), 79 grams protein, and 647 ml free water per day. Goal rate without propofol: 45 ml/hr to provide 1620 kcal, 102 grams protein, and 832 ml free water per day.  2. Fluids per MD.   3. Bowel regimen.      RD following.

## 2021-01-15 NOTE — PROCEDURES
Procedures  DATE OF OPERATION: 1/15/2021     PREOPERATIVE DIAGNOSIS: Respiratory failure with hypoxia, covid 19 pneumonia, increased tracheal secretions, suspected aspiration    POSTOPERATIVE DIAGNOSIS: same, purulent secretions    PROCEDURE PERFORMED: Fiberoptic bronchoscopy with bronchoalveolar lavage    SURGEON: Orestes Ernandez M.D.    ANESTHESIA: Intravenous sedation, analgesia, and pharmacologic restraint     INDICATIONS: The patient is a 65 y.o. male with acute respiratory failure.     FINDINGS: Purulent secretions present, moderate RLL and LLL on lavage of airway segments.  Yellow in color with small airway mucus plugging present.  Mucus secretions present but not occlusive on right main stem posterior wall, suctioned until clear.  Otherwise initial inspection right left mainstem, trachea, and RLL, LLL, BUSHRA, RUL and RML mostly clear with limited secretions/erythema with lavage with normal saline and suctioning of each segment listed above.  Bronchoalveolar lavage of a RLL segment with 50 ml of normal saline insipated into airway, lavaged and suctioned into sterile trap and sent for cultures.      SPECIMEN: Bronchoalveolar lavage for cultures    PROCEDURE: Following informed consent, the patient was properly identified and optimally positioned in bed. He was preoxygenated with 100% oxygen and placed on a regular ventilatory rate. Intravenous sedation, analgesia, and pharmacologic restraint was administered.    The fiberoptic bronchoscope was advanced through the indwelling endotracheal tube.  The upper airways were suctioned. The airways were systematically and sequentially inspected and lavaged as noted above. The bronchoalveolar lavage effluent was collected in a sterile trap and submitted for cultures as noted above.     The patient tolerated the procedure well. There were no apparent complications.    ____________________________________   Orestes Ernandez M.D.    DD: 1/15/2021  2:21 PM

## 2021-01-15 NOTE — PROGRESS NOTES
"Critical Care Progress Note    Date of admission  1/12/2021    Chief Complaint  65 y.o. male admitted 1/12/2021 with covid 19 pneumonia.  Multiple hospitalizations with remdesivir two courses and decadron.  Prisoner from [place], [place].  Required intubation on transfer 1/12.    Per Dr Roman consult note: 65 y.o. male who presented 1/12/2021 as direct admission for worsening COVID-19 pneumonia and respiratory failure from [facility]; currently inpatient status since 1/3/2021 at [facility].  Patient is a 65-year-old male, inmate at the correctional facility there, history of seizure disorder and remote IVDA and smoking history.  He presented just before [place] and subsequent acquired hospitalization and oxygen.  He was discharged back to the facility on January 2 but had to be immediately readmitted on January 3 with increasing respiratory distress, initially placed on CPAP.  He has been treated with 2 courses of remdesivir (11 total doses), IV corticosteroids, currently on Solu-Medrol 40 mg every 8 hours, 2 different courses of antibiotics currently discontinued and full dose anticoagulation since admission with full dose Lovenox for elevated D-dimer of 20, subsequently dropped to 2.5.  CT and chest x-rays showed bilateral alveolar infiltrates consistent with Covid pneumonia.  He has not developed renal failure or hepatic failure but has developed some \"Covid toes.\"  Over the past 2 to 3 days has had increasing oxygen requirements, currently on Vapotherm (HFNC) 40 L, 100% with oxygen desaturation to the mid 80s.  He initially declined intubation but now would accept a time-limited trial of intubation per report.  He was intubated for transport.      Hospital Course  No notes on file  1/12 intubated on transfer to this facility  1/13 on mechanical ventilator    Interval Problem Update  Reviewed last 24 hour events:  Patient intubated per Dr Brooks overnight  On propofol and fentanyl for " sedation  Bronchoscopy today  No focal deficits    Addendum  Moderate secretions tia lower lobes bronchoscopy, small airways, bal sent  Arterial line placed, remains on levophed and on vasopressin  Continue on mechanical vent today, no need for sbt as intubated this am    Review of Systems  Review of Systems   Unable to perform ROS: Intubated        Vital Signs for last 24 hours   Temp:  [36.5 °C (97.7 °F)-38 °C (100.4 °F)] 38 °C (100.4 °F)  Pulse:  [] 81  Resp:  [20-50] 28  BP: ()/(45-90) 97/56  SpO2:  [71 %-100 %] 100 %    Hemodynamic parameters for last 24 hours       Respiratory Information for the last 24 hours  Vent Mode: APVCMV  Rate (breaths/min): 20  Vt Target (mL): 380  PEEP/CPAP: 8  MAP: 11  Control VTE (exp VT): 422    Physical Exam   Physical Exam  Vitals signs and nursing note reviewed.   Constitutional:       General: He is not in acute distress.     Appearance: He is ill-appearing. He is not toxic-appearing or diaphoretic.   HENT:      Head: Normocephalic and atraumatic.      Right Ear: External ear normal.      Left Ear: External ear normal.      Nose: No congestion or rhinorrhea.      Mouth/Throat:      Mouth: Mucous membranes are moist.      Pharynx: Oropharynx is clear. No oropharyngeal exudate or posterior oropharyngeal erythema.   Eyes:      General: No scleral icterus.        Right eye: No discharge.         Left eye: No discharge.      Conjunctiva/sclera: Conjunctivae normal.      Pupils: Pupils are equal, round, and reactive to light.   Neck:      Musculoskeletal: Normal range of motion. No neck rigidity or muscular tenderness.   Cardiovascular:      Rate and Rhythm: Normal rate and regular rhythm.      Pulses: Normal pulses.      Heart sounds: Normal heart sounds. No murmur.   Pulmonary:      Effort: No respiratory distress.      Breath sounds: No stridor. No wheezing, rhonchi or rales.   Chest:      Chest wall: No tenderness.   Abdominal:      General: There is no distension.       Palpations: There is no mass.      Tenderness: There is no abdominal tenderness. There is no guarding.   Musculoskeletal:         General: No swelling, tenderness or deformity.      Right lower leg: No edema.      Left lower leg: No edema.   Lymphadenopathy:      Cervical: No cervical adenopathy.   Skin:     Coloration: Skin is not jaundiced or pale.      Findings: No bruising, erythema, lesion or rash.   Neurological:      General: No focal deficit present.      Mental Status: He is alert.      Cranial Nerves: No cranial nerve deficit.      Sensory: No sensory deficit.      Motor: No weakness.      Coordination: Coordination normal.      Gait: Gait normal.      Comments: Intubated and sedated         Medications  Current Facility-Administered Medications   Medication Dose Route Frequency Provider Last Rate Last Admin   • famotidine (PEPCID) tablet 20 mg  20 mg Enteral Tube Q12HRS Sadie Brooks M.D.        Or   • famotidine (PEPCID) injection 20 mg  20 mg Intravenous Q12HRS Sadie Brooks M.D.   20 mg at 01/15/21 0539   • MD Alert...ICU Electrolyte Replacement per Pharmacy   Other PHARMACY TO DOSE Sadie Brooks M.D.       • lidocaine (XYLOCAINE) 1 % injection 1-2 mL  1-2 mL Tracheal Tube Q30 MIN PRN Sadie Brooks M.D.       • fentaNYL (SUBLIMAZE) injection 25 mcg  25 mcg Intravenous Q HOUR PRN Sadie Brooks M.D.        Or   • fentaNYL (SUBLIMAZE) injection 50 mcg  50 mcg Intravenous Q HOUR PRN Sadie Brooks M.D.        Or   • fentaNYL (SUBLIMAZE) injection 100 mcg  100 mcg Intravenous Q HOUR PRN Sadie Brooks M.D.   100 mcg at 01/15/21 0352   • fentaNYL (SUBLIMAZE) 50 mcg/mL in 50mL   Intravenous Continuous Sadie Brooks M.D. 1 mL/hr at 01/15/21 0407 50 mcg at 01/15/21 0407   • propofol (DIPRIVAN) injection  0-80 mcg/kg/min Intravenous Continuous Orestes Ernandez M.D. 26.2 mL/hr at 01/15/21 0643 70 mcg/kg/min at 01/15/21 0643   • potassium phosphate IVPB 30 mmol in 500 mL D5W  (premix)  30 mmol Intravenous Once Orestes Ernandez M.D.       • Pharmacy Consult: Enteral tube insertion - review meds/change route/product selection   Other PHARMACY TO DOSE Orestes Ernandez M.D.       • ondansetron (ZOFRAN ODT) dispertab 4 mg  4 mg Enteral Tube Q6HRS PRN Orestes Ernandez M.D.       • haloperidol lactate (HALDOL) injection 1 mg  1 mg Intravenous Q4HRS PRN Orestes Ernandez M.D.   1 mg at 01/14/21 1313   • levETIRAcetam (KEPPRA) tablet 500 mg  500 mg Enteral Tube Q12HRS Orestes Ernandez M.D.   500 mg at 01/15/21 0540   • haloperidol (HALDOL) tablet 2 mg  2 mg Enteral Tube BID Orestes Ernandez M.D.   2 mg at 01/15/21 0505   • enoxaparin (LOVENOX) inj 40 mg  40 mg Subcutaneous DAILY Orestes Ernandez M.D.   40 mg at 01/15/21 0539   • phenytoin (DILANTIN) 100 MG/4ML suspension 200 mg  200 mg Enteral Tube Q8HRS Orestes Ernandez M.D.   200 mg at 01/15/21 0507   • nystatin-tetracycline-prednisone-diphenhydramine (MIRACLE MOUTH WASH) oral susp 5 mL  5 mL Oral 4X/DAY Fred Roman M.D.   5 mL at 01/14/21 2159   • ondansetron (ZOFRAN) syringe/vial injection 4 mg  4 mg Intravenous Q6HRS PRN Fred Roman M.D.       • labetalol (NORMODYNE/TRANDATE) injection 10 mg  10 mg Intravenous Q6HRS PRN Fred Roman M.D.       • Respiratory Therapy Consult   Nebulization Continuous RT Fred Roman M.D.       • ipratropium-albuterol (DUONEB) nebulizer solution  3 mL Nebulization Q2HRS PRN (RT) Fred Roman M.D.       • senna-docusate (PERICOLACE or SENOKOT S) 8.6-50 MG per tablet 2 Tab  2 Tab Enteral Tube BID Fred Roman M.D.   2 Tab at 01/15/21 0505    And   • polyethylene glycol/lytes (MIRALAX) PACKET 1 Packet  1 Packet Enteral Tube QDAY PRN Fred Roman M.D.        And   • magnesium hydroxide (MILK OF MAGNESIA) suspension 30 mL  30 mL Enteral Tube QDAY PRN Fred Roman M.D.        And   • bisacodyl (DULCOLAX) suppository 10 mg  10 mg Rectal QDAY PRN Fred Roman M.D.       • acetaminophen (Tylenol) tablet 650  mg  650 mg Enteral Tube Q6HRS PRN Fred Roman M.D.   650 mg at 01/15/21 0505   • dexmedetomidine (PRECEDEX) 400 mcg/100mL NS premix infusion  0.1-1.5 mcg/kg/hr Intravenous Continuous Orestes Ernandez M.D. 17.3 mL/hr at 01/15/21 0600 1.1 mcg/kg/hr at 01/15/21 0600   • norepinephrine (Levophed) infusion 8 mg/250 mL (premix)  0-30 mcg/min Intravenous Continuous Orestes Ernandez M.D. 15 mL/hr at 01/15/21 0600 8 mcg/min at 01/15/21 0600   • LORazepam (ATIVAN) injection 1-2 mg  1-2 mg Intravenous Q2HRS PRN Orestes Ernandez M.D.   2 mg at 01/15/21 0219   • morphine (pf) 4 mg/mL injection 2-4 mg  2-4 mg Intravenous Q2HRS PRN Fred Roman M.D.   4 mg at 01/14/21 0504       Fluids    Intake/Output Summary (Last 24 hours) at 1/15/2021 0742  Last data filed at 1/15/2021 0600  Gross per 24 hour   Intake 2616.86 ml   Output 2155 ml   Net 461.86 ml       Laboratory  Recent Labs     01/13/21  1316 01/15/21  0246 01/15/21  0502   ISTATAPH 7.400 7.478 7.376*   ISTATAPCO2 36.6 28.7 40.6*   ISTATAPO2 89* 46* 73   ISTATATCO2 24 22 25   UDCLIIF8RDA 97 85* 94   ISTATARTHCO3 22.7 21.3 23.8   ISTATARTBE -2 -1 -1   ISTATTEMP 98.8 F 97.9 F 100.3 F   ISTATFIO2 100 100 90   ISTATSPEC Arterial Arterial Arterial   ISTATAPHTC 7.399* 7.484 7.362*   RCEUSCIB3MO 90* 44* 77         Recent Labs     01/13/21  0629 01/14/21  0544 01/15/21  0240   SODIUM 132* 135 139   POTASSIUM 3.8 3.9 3.4*   CHLORIDE 103 106 108   CO2 24 21 23   BUN 17 16 16   CREATININE 0.85 0.64 0.64   MAGNESIUM 2.4 2.1 2.1   PHOSPHORUS 2.7 2.2* 1.8*   CALCIUM 7.8* 7.6* 7.7*     Recent Labs     01/13/21 0629 01/14/21  0544 01/15/21  0240   ALTSGPT 35  --   --    ASTSGOT 56*  --   --    ALKPHOSPHAT 62  --   --    TBILIRUBIN 0.4  --   --    PREALBUMIN  --  12.0*  --    GLUCOSE 95 94 123*     Recent Labs     01/13/21  0629 01/14/21  0544 01/15/21  0240   WBC 15.5* 16.3* 17.7*   NEUTSPOLYS 88.60* 90.30* 89.90*   LYMPHOCYTES 4.50* 3.40* 3.50*   MONOCYTES 3.80 3.90 4.20   EOSINOPHILS 1.30  0.70 0.80   BASOPHILS 0.30 0.30 0.30   ASTSGOT 56*  --   --    ALTSGPT 35  --   --    ALKPHOSPHAT 62  --   --    TBILIRUBIN 0.4  --   --      Recent Labs     01/13/21  0629 01/14/21  0544 01/15/21  0240   RBC 4.71 4.28* 4.46*   HEMOGLOBIN 14.3 13.3* 13.7*   HEMATOCRIT 43.4 39.8* 40.6*   PLATELETCT 297 255 276       Imaging  X-Ray:  I have personally reviewed the images and compared with prior images.  EKG:  I have personally reviewed the images and compared with prior images.    Assessment/Plan  * Acute respiratory failure with hypoxia (HCC)  Assessment & Plan  Secondary to COVID-19 pneumonia  Progressive hypoxia failing HFNC and intubated 1/12 prior to transport  Continue full ventilator support, LTV/LPS, wean as tolerated, all ventilator bundles in place  Follow-up chest imaging adequate  Extubated 1/13, high flow nc  High risk re-intubation, reintubated 1/15 in am  Bronchoscopy due to continued secretions  Sedation required due to underlying psychosis contributory likely    Pneumonia due to COVID-19 virus  Assessment & Plan  Initially COVID-19 +12/24-rain  Completed 2 courses of remdesivir (11 doses)  Remains on corticosteroids, currently methylprednisolone 40 3 times daily  Follow-up chest imaging, monitor for secondary infections  Completed 2 course of antibiotics initially with C3/azithromycin, then levofloxacin  We will repeat sputum culture, MRSA nares  Full dose anticoagulation for COVID-19 pneumonia with high D-dimer 20 on admission  Reintubation 1/15    Acute encephalopathy  Assessment & Plan  Secondary to hypoxia  Complicated by underlying psychosis  No focal deficits  qtc prolonged, 510 initial, decreased  Scheduled haldol bid, on daily at home  Required re intubation      Essential hypertension  Assessment & Plan  BP control    Seizure disorder (HCC)  Assessment & Plan  Continue phenytoin       VTE:  Lovenox  Ulcer: H2 Antagonist  Lines: Huang Catheter  Ongoing indication addressed    I have performed a  physical exam and reviewed and updated ROS and Plan today (1/15/2021). In review of yesterday's note (1/14/2021), there are no changes except as documented above.     Discussed patient condition and risk of morbidity and/or mortality with RN, RT, Pharmacy, Code status disscussed and Charge nurse / hot rounds     The patient remains critically ill.  Required re intubation for covid 19 pneumonia and respiratory failure.  Propofol and fentanyl for sedation.  Levophed for map > 65 and sbp > 90.  Critical care time = 60 minutes in directly providing and coordinating critical care and extensive data review.  No time overlap and excludes procedures.

## 2021-01-15 NOTE — PROGRESS NOTES
0319- at bedside, all equipment ready and patient agreeable to intubation.   0320-Time-out performed, all agreed.   0320- 20mg of Etomidate followed by 60mg of Rocuronium given.   0322- Patient intubated, positive color change and secured at 24 cm at the gum.

## 2021-01-15 NOTE — CARE PLAN
Problem: Communication  Goal: The ability to communicate needs accurately and effectively will improve  Outcome: PROGRESSING AS EXPECTED     Problem: Safety  Goal: Will remain free from injury  Outcome: PROGRESSING AS EXPECTED  Goal: Will remain free from falls  Outcome: PROGRESSING AS EXPECTED     Problem: Venous Thromboembolism (VTW)/Deep Vein Thrombosis (DVT) Prevention:  Goal: Patient will participate in Venous Thrombosis (VTE)/Deep Vein Thrombosis (DVT)Prevention Measures  Outcome: PROGRESSING AS EXPECTED     Problem: Bowel/Gastric:  Goal: Normal bowel function is maintained or improved  Outcome: PROGRESSING AS EXPECTED  Goal: Will not experience complications related to bowel motility  Outcome: PROGRESSING AS EXPECTED     Problem: Knowledge Deficit  Goal: Knowledge of disease process/condition, treatment plan, diagnostic tests, and medications will improve  Outcome: PROGRESSING AS EXPECTED  Goal: Knowledge of the prescribed therapeutic regimen will improve  Outcome: PROGRESSING AS EXPECTED     Problem: Discharge Barriers/Planning  Goal: Patient's continuum of care needs will be met  Outcome: PROGRESSING AS EXPECTED     Problem: Fluid Volume:  Goal: Will maintain balanced intake and output  Outcome: PROGRESSING AS EXPECTED     Problem: Pain Management  Goal: Pain level will decrease to patient's comfort goal  Outcome: PROGRESSING AS EXPECTED     Problem: Psychosocial Needs:  Goal: Level of anxiety will decrease  Outcome: PROGRESSING AS EXPECTED     Problem: Urinary Elimination:  Goal: Ability to reestablish a normal urinary elimination pattern will improve  Outcome: PROGRESSING AS EXPECTED     Problem: Risk for injury related to Behavioral Restraint use  Goal: Safe and appropriate use of behavioral restraints.  Restraints discontinued at the earliest possibility while ensuring patient safety.  Outcome: PROGRESSING AS EXPECTED

## 2021-01-15 NOTE — Clinical Note
@ appoximately 2021, pt noted to be in either Vtach vs SVT or artifact on monitor. bp stable, no LOC. Pt c/o sob/cp. Increased oxymask to 10L. HOB elevated. Dr. Roman notified, no orders at this time. Wctm.

## 2021-01-15 NOTE — PROGRESS NOTES
See ABG results on 100% FiO2, 70LPM and oxymask 15L. Dr. Brooks notified. No new orders at this time.

## 2021-01-15 NOTE — CARE PLAN
Problem: Nutritional:  Goal: Nutrition support tolerated and meeting greater than 85% of estimated needs  Outcome: PROGRESSING AS EXPECTED     TF advancing, but regimen now changed per RD.

## 2021-01-15 NOTE — PROGRESS NOTES
Pulmonary/Critical Care Medicine   Cross Cover Note    Date of service: 1/15/2021  Time: 0300    Notified by RN and RT of prn ABG due to patient with increased work of breathing while on Precedex at 1.2.      AB.48/ on HFNC at 60 lpm at 100% with 15 lpm NRBM.  O2 sat was 95-97%.    RN asked me to the bedside and decision made to intubate.    Sadie Brooks MD  Pulmonary and Critical Care Medicine

## 2021-01-15 NOTE — PROCEDURES
Arterial line, right radial artery, us guided  Reason: hemodynamic monitoring and respiratory failure with frequent ABG, on vasopressors    Procedure:  Sterile procedure.  Draped appropriately.  Right radial artery, US guided.  Needle inserted with flash obtained.  Guidewire inserted through needle, catheter progressed over wire, wire removed, catheter sutured in place with two sutures, chlorhexidine patch and bandage applied.  Connected appropriately.  No complications. Less than 3 cc blood loss.

## 2021-01-15 NOTE — PROCEDURES
Intubation    Date/Time: 1/15/2021 3:42 AM  Performed by: Sadie Brooks M.D.  Authorized by: Sadie Brooks M.D.     Consent:     Consent obtained:  Emergent situation    Risks discussed:  Death, brain injury, pneumothorax and hypoxia    Alternatives discussed:  No treatment  Pre-procedure details:     Patient status:  Altered mental status    Mallampati score:  II    Pretreatment meds: etomidate.    Paralytics:  Rocuronium  Procedure details:     Preoxygenation:  Nonrebreather mask (HFNC at 60 lpm at 100%)    CPR in progress: no      Intubation method:  Oral    Oral intubation technique:  Video-assisted    Laryngoscope type:  GlideScope    Laryngoscope blade:  Mac 3    Cormack-Lehane Classification:  Grade 1    Tube size (mm):  8.0    Tube type:  Cuffed    Number of attempts:  1    Ventilation between attempts: no      Cricoid pressure: no      Tube visualized through cords: yes    Placement assessment:     ETT to teeth:  24 cm at the gums    Tube secured with:  ETT medina    Breath sounds:  Equal    Placement verification: chest rise, condensation, CXR verification and ETCO2 detector      CXR findings:  ETT in proper place  Post-procedure details:     Patient tolerance of procedure:  Tolerated well, no immediate complications

## 2021-01-16 PROBLEM — Z66 DNAR (DO NOT ATTEMPT RESUSCITATION): Status: ACTIVE | Noted: 2021-01-01

## 2021-01-16 NOTE — CARE PLAN
Ventilator Daily Summary    Vent Day # 2    Ventilator settings: 28, 370, 8, 60%    Plan: Continue current ventilator settings and wean mechanical ventilation as tolerated per physician orders.

## 2021-01-16 NOTE — RESPIRATORY CARE
Vent Day # 2     Ventilator settings changed this shift:yes   made this shift. TV to 440 per MD     20/440/8/80% 8.0 @ 24     Respiratory Procedures:    Pt was bronched this after noon.      Plan: Continue current ventilator settings and wean mechanical ventilation as tolerated per physician orders.

## 2021-01-16 NOTE — CARE PLAN
Vent Day # 1     Ventilator settings changed this shift: no changes made this shift.     20/380/8/80% 8.0 @ 24    Respiratory Procedures:    Pt was bronched this after noon.     Plan: Continue current ventilator settings and wean mechanical ventilation as tolerated per physician orders.

## 2021-01-16 NOTE — PROGRESS NOTES
"Critical Care Progress Note    Date of admission  1/12/2021    Chief Complaint  65 y.o. male admitted 1/12/2021 with covid 19 pneumonia.  Multiple hospitalizations with remdesivir two courses and decadron.  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  Required intubation on transfer 1/12.    Per Dr Roman consult note: 65 y.o. male who presented 1/12/2021 as direct admission for worsening COVID-19 pneumonia and respiratory failure from [facility]; currently inpatient status since 1/3/2021 at [facility].  Patient is a 65-year-old male, inmate at the correctional facility there, history of seizure disorder and remote IVDA and smoking history.  He presented just before [facility] and subsequent acquired hospitalization and oxygen.  He was discharged back to the facility on January 2 but had to be immediately readmitted on January 3 with increasing respiratory distress, initially placed on CPAP.  He has been treated with 2 courses of remdesivir (11 total doses), IV corticosteroids, currently on Solu-Medrol 40 mg every 8 hours, 2 different courses of antibiotics currently discontinued and full dose anticoagulation since admission with full dose Lovenox for elevated D-dimer of 20, subsequently dropped to 2.5.  CT and chest x-rays showed bilateral alveolar infiltrates consistent with Covid pneumonia.  He has not developed renal failure or hepatic failure but has developed some \"Covid toes.\"  Over the past 2 to 3 days has had increasing oxygen requirements, currently on Vapotherm (HFNC) 40 L, 100% with oxygen desaturation to the mid 80s.  He initially declined intubation but now would accept a time-limited trial of intubation per report.  He was intubated for transport.      Hospital Course  No notes on file  1/12 intubated on transfer to this facility  1/13 on mechanical ventilator    Interval Problem Update  Reviewed last 24 hour events:  Remains on ventilator  sbt and sat  Wean sedation, on fentanyl and propofol " Pt refused to be on remote tele. Pt is non compliance. Gets out of bed without the nurses assistance. Takes off SCD by himself. Turns himself. Pt was educated on the proper  when laying in bed. Pt was educated to call for assistance when getting out of bed and when needed to turn. drips  Levophed for map > 65 and sbp > 90    Addendum  Not ready for extubation today, continue current management  Changed code status to DNAR, CPR in coronavirus 19 on mechanical ventilator nearly 100% mortality, CPR non beneficial in this patient.  Changed to DNAR and intubation ok.    Review of Systems  Review of Systems   Unable to perform ROS: Intubated        Vital Signs for last 24 hours   Temp:  [37.1 °C (98.8 °F)-38.9 °C (102 °F)] 37.1 °C (98.8 °F)  Pulse:  [] 68  Resp:  [10-26] 20  BP: ()/(46-76) 101/60  SpO2:  [95 %-100 %] 95 %    Hemodynamic parameters for last 24 hours       Respiratory Information for the last 24 hours  Vent Mode: APVCMV  Rate (breaths/min): 20  Vt Target (mL): 440  PEEP/CPAP: 8  MAP: 18  Control VTE (exp VT): 436    Physical Exam   Physical Exam  Vitals signs and nursing note reviewed.   Constitutional:       General: He is not in acute distress.     Appearance: He is ill-appearing. He is not toxic-appearing or diaphoretic.   HENT:      Head: Normocephalic and atraumatic.      Right Ear: External ear normal.      Left Ear: External ear normal.      Nose: No congestion or rhinorrhea.      Mouth/Throat:      Mouth: Mucous membranes are moist.      Pharynx: Oropharynx is clear. No oropharyngeal exudate or posterior oropharyngeal erythema.   Eyes:      General: No scleral icterus.        Right eye: No discharge.         Left eye: No discharge.      Conjunctiva/sclera: Conjunctivae normal.      Pupils: Pupils are equal, round, and reactive to light.   Neck:      Musculoskeletal: Normal range of motion. No neck rigidity or muscular tenderness.   Cardiovascular:      Rate and Rhythm: Normal rate and regular rhythm.      Pulses: Normal pulses.      Heart sounds: Normal heart sounds. No murmur.   Pulmonary:      Effort: No respiratory distress.      Breath sounds: No stridor. No wheezing, rhonchi or rales.   Chest:      Chest wall: No tenderness.   Abdominal:      General: There  is no distension.      Palpations: There is no mass.      Tenderness: There is no abdominal tenderness. There is no guarding.   Musculoskeletal:         General: No swelling, tenderness or deformity.      Right lower leg: No edema.      Left lower leg: No edema.   Lymphadenopathy:      Cervical: No cervical adenopathy.   Skin:     Coloration: Skin is not jaundiced or pale.      Findings: No bruising, erythema, lesion or rash.   Neurological:      General: No focal deficit present.      Mental Status: He is alert.      Cranial Nerves: No cranial nerve deficit.      Sensory: No sensory deficit.      Motor: No weakness.      Coordination: Coordination normal.      Gait: Gait normal.      Comments: Intubated and sedated         Medications  Current Facility-Administered Medications   Medication Dose Route Frequency Provider Last Rate Last Admin   • famotidine (PEPCID) tablet 20 mg  20 mg Enteral Tube Q12HRS Sadie Brooks M.D.        Or   • famotidine (PEPCID) injection 20 mg  20 mg Intravenous Q12HRS Sadie Brooks M.D.   20 mg at 01/16/21 0505   • MD Alert...ICU Electrolyte Replacement per Pharmacy   Other PHARMACY TO DOSE Sadie Brooks M.D.       • lidocaine (XYLOCAINE) 1 % injection 1-2 mL  1-2 mL Tracheal Tube Q30 MIN PRN Sadie Brooks M.D.       • fentaNYL (SUBLIMAZE) injection 25 mcg  25 mcg Intravenous Q HOUR PRN Sadie Brooks M.D.        Or   • fentaNYL (SUBLIMAZE) injection 50 mcg  50 mcg Intravenous Q HOUR PRN Sadie Brooks M.D.        Or   • fentaNYL (SUBLIMAZE) injection 100 mcg  100 mcg Intravenous Q HOUR PRN Sadie Brooks M.D.   100 mcg at 01/15/21 0352   • fentaNYL (SUBLIMAZE) 50 mcg/mL in 50mL   Intravenous Continuous Sadie Brooks M.D. 6 mL/hr at 01/16/21 0404 300 mcg/hr at 01/16/21 0404   • propofol (DIPRIVAN) injection  0-80 mcg/kg/min Intravenous Continuous Orestes Ernandez M.D. 30 mL/hr at 01/16/21 0505 80 mcg/kg/min at 01/16/21 0505   • propofol (DIPRIVAN) injection   100-200 mg Intravenous Once Orestes Ernandez M.D.       • vasopressin (VASOSTRICT) 20 Units in  mL Infusion  0.03 Units/min Intravenous Continuous Orestes Ernandez M.D. 9 mL/hr at 01/16/21 0718 0.03 Units/min at 01/16/21 0718   • phenytoin (DILANTIN) injection 200 mg  200 mg Intravenous Q8HRS Orestes Ernandez M.D.   200 mg at 01/16/21 0504   • acetaminophen (Tylenol) tablet 650 mg  650 mg Enteral Tube Q6HRS PRN Orestes Ernandez M.D.       • levETIRAcetam (Keppra) 500 mg in 100 mL NaCl IV premix  500 mg Intravenous Q12HRS Orestes Ernandez M.D.   Stopped at 01/16/21 0519   • Pharmacy Consult: Enteral tube insertion - review meds/change route/product selection   Other PHARMACY TO DOSE Orestes Ernandez M.D.       • ondansetron (ZOFRAN ODT) dispertab 4 mg  4 mg Enteral Tube Q6HRS PRN Orestes Ernandez M.D.       • haloperidol (HALDOL) tablet 2 mg  2 mg Enteral Tube BID Orestes Ernandez M.D.   2 mg at 01/16/21 0600   • enoxaparin (LOVENOX) inj 40 mg  40 mg Subcutaneous DAILY Orestes Ernandez M.D.   40 mg at 01/16/21 0504   • ondansetron (ZOFRAN) syringe/vial injection 4 mg  4 mg Intravenous Q6HRS PRN Fred Roman M.D.       • labetalol (NORMODYNE/TRANDATE) injection 10 mg  10 mg Intravenous Q6HRS PRN Fred Roman M.D.       • Respiratory Therapy Consult   Nebulization Continuous RT Fred Roman M.D.       • ipratropium-albuterol (DUONEB) nebulizer solution  3 mL Nebulization Q2HRS PRN (RT) Fred Roman M.D.       • senna-docusate (PERICOLACE or SENOKOT S) 8.6-50 MG per tablet 2 Tab  2 Tab Enteral Tube BID Fred Roman M.D.   2 Tab at 01/16/21 0504    And   • polyethylene glycol/lytes (MIRALAX) PACKET 1 Packet  1 Packet Enteral Tube QDAY PRN Fred Roman M.D.        And   • magnesium hydroxide (MILK OF MAGNESIA) suspension 30 mL  30 mL Enteral Tube QDAY PRN Fred Roman M.D.        And   • bisacodyl (DULCOLAX) suppository 10 mg  10 mg Rectal QDAY PRN Fred Roman M.D.       • norepinephrine (Levophed) infusion 8  mg/250 mL (premix)  0-30 mcg/min Intravenous Continuous Orestes Ernandez M.D. 26.3 mL/hr at 01/16/21 0631 14 mcg/min at 01/16/21 0631       Fluids    Intake/Output Summary (Last 24 hours) at 1/16/2021 0719  Last data filed at 1/16/2021 0631  Gross per 24 hour   Intake 2214.75 ml   Output 800 ml   Net 1414.75 ml       Laboratory  Recent Labs     01/15/21  0246 01/15/21  0502 01/16/21  0209   ISTATAPH 7.478 7.376* 7.289*   ISTATAPCO2 28.7 40.6* 53.6*   ISTATAPO2 46* 73 83   ISTATATCO2 22 25 27   [id number] 85* 94 95   ISTATARTHCO3 21.3 23.8 25.7*   ISTATARTBE -1 -1 -2   ISTATTEMP 97.9 F 100.3 F 36.2 C   ISTATFIO2 100 90 80   ISTATSPEC Arterial Arterial Arterial   ISTATAPHTC 7.484 7.362* 7.300*   [id number] 44* 77 79         Recent Labs     01/14/21  0544 01/15/21  0240 01/16/21  0525   SODIUM 135 139 136   POTASSIUM 3.9 3.4* 4.2   CHLORIDE 106 108 104   CO2 21 23 23   BUN 16 16 11   CREATININE 0.64 0.64 0.41*   MAGNESIUM 2.1 2.1 2.2   PHOSPHORUS 2.2* 1.8* 3.2   CALCIUM 7.6* 7.7* 7.5*     Recent Labs     01/14/21  0544 01/15/21  0240 01/16/21  0525   PREALBUMIN 12.0*  --   --    GLUCOSE 94 123* 153*     Recent Labs     01/14/21  0544 01/15/21  0240 01/16/21  0525   WBC 16.3* 17.7* 21.1*   NEUTSPOLYS 90.30* 89.90* 86.10*   LYMPHOCYTES 3.40* 3.50* 3.60*   MONOCYTES 3.90 4.20 5.30   EOSINOPHILS 0.70 0.80 2.90   BASOPHILS 0.30 0.30 0.60     Recent Labs     01/14/21  0544 01/15/21  0240 01/16/21  0525   RBC 4.28* 4.46* 4.28*   HEMOGLOBIN 13.3* 13.7* 12.8*   HEMATOCRIT 39.8* 40.6* 39.7*   PLATELETCT 255 276 272       Imaging  X-Ray:  I have personally reviewed the images and compared with prior images.  EKG:  I have personally reviewed the images and compared with prior images.    Assessment/Plan  * Acute respiratory failure with hypoxia (HCC)  Assessment & Plan  Secondary to COVID-19 pneumonia  Progressive hypoxia failing HFNC and intubated 1/12 prior to transport  Continue full ventilator support, LTV/LPS, wean as  tolerated, all ventilator bundles in place  Follow-up chest imaging adequate  Extubated 1/13, high flow nc  High risk re-intubation, reintubated 1/15 in am  Bronchoscopy due to continued secretions  Sedation required due to underlying psychosis contributory likely    Pneumonia due to COVID-19 virus  Assessment & Plan  Initially COVID-19 +12/24-rain  Completed 2 courses of remdesivir (11 doses)  Remains on corticosteroids, currently methylprednisolone 40 3 times daily  Follow-up chest imaging, monitor for secondary infections  Completed 2 course of antibiotics initially with C3/azithromycin, then levofloxacin  We will repeat sputum culture, MRSA nares  Full dose anticoagulation for COVID-19 pneumonia with high D-dimer 20 on admission  Reintubation 1/15    DNAR (do not attempt resuscitation)  Assessment & Plan  Changed code status to DNAR, CPR in coronavirus 19 on mechanical ventilator nearly 100% mortality, CPR non beneficial in this patient.  Changed to DNAR and intubation ok.    Acute encephalopathy  Assessment & Plan  Secondary to hypoxia  Complicated by underlying psychosis  No focal deficits  qtc prolonged, 510 initial, decreased  Scheduled haldol bid, on daily at home  Required re intubation      Essential hypertension  Assessment & Plan  BP control    Seizure disorder (HCC)  Assessment & Plan  Continue phenytoin       VTE:  Lovenox  Ulcer: H2 Antagonist  Lines: Huang Catheter  Ongoing indication addressed    I have performed a physical exam and reviewed and updated ROS and Plan today (1/16/2021). In review of yesterday's note (1/15/2021), there are no changes except as documented above.     Discussed patient condition and risk of morbidity and/or mortality with RN, RT, Pharmacy, Code status disscussed and Charge nurse / hot rounds     The patient remains critically ill.  On mechanical ventilator.  SBT and SAT as tolerated.  Propofol and fentanyl for sedation.  Levophed for map > 65 and sbp > 90.  Critical care  time = 45 minutes in directly providing and coordinating critical care and extensive data review.  No time overlap and excludes procedures.

## 2021-01-16 NOTE — ASSESSMENT & PLAN NOTE
Changed code status to DNAR, CPR in coronavirus 19 on mechanical ventilator nearly 100% mortality, CPR non beneficial in this patient.  Changed to DNAR and intubation ok.

## 2021-01-17 NOTE — PROGRESS NOTES
"Pharmacy Kinetics 65 y.o. male on vancomycin day # 1 2021    Currently on Vancomycin New Start   Provider specified end date: TBD  Other antibiotics: Zosyn 4.5 g IV q8h    Indication for Treatment: Empiric - SSTI +/- pneumonia     Pertinent history per medical record: Admitted on 2021 for worsening hypoxia - recent hospital admissions for COVID.  Patient intubated from - and has remained intubated since 1/15.  PCT this morning was 9.85 with Tmax of 101.8 F.  Empiric broad spectrum antibiotics initiated.      Allergies: Codeine and Sulfa drugs     List concerns for renal function: age, low albumin (2.1), shock on vasoactive medications     Pertinent cultures to date:   21 - MRSA by PCR: ordered  21 - Peripheral BC x 2: in process   1/15/21 - BAL: no organisms on gram stain     Recent Labs     01/15/21  0240 21  0525 21  0525   WBC 17.7* 21.1* 6.5   NEUTSPOLYS 89.90* 86.10* 71.30   BANDSSTABS  --   --  18.90*     Recent Labs     01/15/21  0240 21  0525 21  0525   BUN 16 11 18   CREATININE 0.64 0.41* 0.73     No results for input(s): VANCOTROUGH, VANCOPEAK, VANCORANDOM in the last 72 hours.    Intake/Output Summary (Last 24 hours) at 2021 1324  Last data filed at 2021 1200  Gross per 24 hour   Intake 2251.46 ml   Output 680 ml   Net 1571.46 ml      Blood Pressure 124/67   Pulse 95   Temperature 38 °C (100.4 °F) (Bladder)   Respiration (Abnormal) 28   Height 1.8 m (5' 10.87\")   Weight 64.2 kg (141 lb 8.6 oz)   Oxygen Saturation 93%  Temp (24hrs), Av.4 °C (99.3 °F), Min:35.1 °C (95.2 °F), Max:38.8 °C (101.8 °F)      A/P   1. Vancomycin dose change: 1500 mg IV x 1 followed by 1000 mg IV q12h (0100/1300)  2. Next vancomycin level:  at 0030, prior to 4th dose (not ordered)   3. Goal trough: 15-20 mcg/mL   4. Comments: Patient started on broad spectrum empiric antibiotics for septic shock - source SSTI +/- pneumonia.  Accumulation concerns noted " above.  No previous vancomycin dosing history to assess clearance.  Despite use of vasoactive medications, UOP has been > 0.5 mL/kg/hr so far today.  For this reason will start patient on scheduled dosing but slightly less aggressive than per protocol (15 mg/kg IV q12h).  Plan for steady state level as above or sooner if decline in renal function.  Continue to follow.     Debo Bagley, PharmD, BCPS, BCCCP    Addendum 1/17/21 1612    Patient's UOP trending down, slight improvement after 1L of LR ordered.  No longer appropriate for scheduled dosing.  Discontinued scheduled dosing and I have ordered a random level (18 hours) after loading dose to determine ongoing dosing plan.  Continue to follow.     Debo Bagley, Jaida, BCPS, BCCCP

## 2021-01-17 NOTE — RESPIRATORY CARE
Vent Day # 2     Ventilator settings changed this shift:settings changed to -10-60%      8.0 @ 24     Respiratory Procedures: none         Plan: Continue current ventilator settings and wean mechanical ventilation as tolerated per physician orders.

## 2021-01-17 NOTE — PROGRESS NOTES
"Critical Care Progress Note    Date of admission  1/12/2021    Chief Complaint  65 y.o. male admitted 1/12/2021 with covid 19 pneumonia.  Multiple hospitalizations with remdesivir two courses and decadron.  Prisoner from [place], [place].  Required intubation on transfer 1/12.    Per Dr Roman consult note: 65 y.o. male who presented 1/12/2021 as direct admission for worsening COVID-19 pneumonia and respiratory failure from [facility]; currently inpatient status since 1/3/2021 at [facility].  Patient is a 65-year-old male, inmate at the correctional facility there, history of seizure disorder and remote IVDA and smoking history.  He presented just before [place] and subsequent acquired hospitalization and oxygen.  He was discharged back to the facility on January 2 but had to be immediately readmitted on January 3 with increasing respiratory distress, initially placed on CPAP.  He has been treated with 2 courses of remdesivir (11 total doses), IV corticosteroids, currently on Solu-Medrol 40 mg every 8 hours, 2 different courses of antibiotics currently discontinued and full dose anticoagulation since admission with full dose Lovenox for elevated D-dimer of 20, subsequently dropped to 2.5.  CT and chest x-rays showed bilateral alveolar infiltrates consistent with Covid pneumonia.  He has not developed renal failure or hepatic failure but has developed some \"Covid toes.\"  Over the past 2 to 3 days has had increasing oxygen requirements, currently on Vapotherm (HFNC) 40 L, 100% with oxygen desaturation to the mid 80s.  He initially declined intubation but now would accept a time-limited trial of intubation per report.  He was intubated for transport.      Hospital Course  No notes on file  1/12 intubated on transfer to this facility  1/13 on mechanical ventilator    Interval Problem Update  Reviewed last 24 hour events:  Asynchrony, agitation overnight  Paralyzed with rocuronium  Paralyze and prone at 1600 " preliminary plan  Fentanyl and propofol drips for sedation  Goal sao2 85% or higher  Vasopressors for map > 65 and sbp > 90  procalcitonin elevated  Blood cultures ordered  Vancomycin and zosyn started 5 days  Repeat inflammatory markers  Echo bedside adequate ef, no pericardial effusion, euvolemic chest, IVC some variation  Fluid status adequate    Addendum  Not necessary to prone or paralyze, tolerated coming off paralytics this am  ? Sedation contributory to hypoxia overnight  Prone/paralyze prn if necessary, current vent synchronous  Ct head if mental status does not improve    Review of Systems  Review of Systems   Unable to perform ROS: Intubated        Vital Signs for last 24 hours   Temp:  [36.6 °C (97.9 °F)-38.8 °C (101.8 °F)] 37 °C (98.6 °F)  Pulse:  [] 89  Resp:  [17-30] 22  BP: ()/(59-77) 102/68  SpO2:  [70 %-100 %] 91 %    Hemodynamic parameters for last 24 hours       Respiratory Information for the last 24 hours  Vent Mode: APVCMV  Rate (breaths/min): 28  Vt Target (mL): 370  PEEP/CPAP: 10  MAP: 18  Control VTE (exp VT): 344    Physical Exam   Physical Exam  Vitals signs and nursing note reviewed.   Constitutional:       General: He is not in acute distress.     Appearance: He is ill-appearing. He is not toxic-appearing or diaphoretic.      Comments: Intubated and sedated   HENT:      Head: Normocephalic and atraumatic.      Nose: No congestion or rhinorrhea.      Mouth/Throat:      Mouth: Mucous membranes are moist.      Pharynx: Oropharynx is clear. No oropharyngeal exudate or posterior oropharyngeal erythema.   Eyes:      General: No scleral icterus.        Right eye: No discharge.         Left eye: No discharge.      Conjunctiva/sclera: Conjunctivae normal.      Pupils: Pupils are equal, round, and reactive to light.   Neck:      Musculoskeletal: Normal range of motion. No neck rigidity or muscular tenderness.   Cardiovascular:      Rate and Rhythm: Normal rate and regular rhythm.       Pulses: Normal pulses.      Heart sounds: Normal heart sounds. No murmur.   Pulmonary:      Effort: Respiratory distress present.      Breath sounds: No stridor. No wheezing, rhonchi or rales.      Comments: Hypoxia, vent asynchrony  Chest:      Chest wall: No tenderness.   Abdominal:      General: There is no distension.      Palpations: There is no mass.      Tenderness: There is no abdominal tenderness. There is no guarding.   Musculoskeletal:         General: No swelling, tenderness or deformity.      Right lower leg: No edema.      Left lower leg: No edema.   Lymphadenopathy:      Cervical: No cervical adenopathy.   Skin:     Coloration: Skin is not jaundiced or pale.      Findings: No bruising, erythema, lesion or rash.      Comments: covid changes toes bilateral   Neurological:      General: No focal deficit present.      Cranial Nerves: No cranial nerve deficit.      Sensory: No sensory deficit.      Motor: No weakness.      Comments: Intubated and sedated         Medications  Current Facility-Administered Medications   Medication Dose Route Frequency Provider Last Rate Last Admin   • midazolam (Versed) 2 MG/2ML injection 5 mg  5 mg Intravenous Q HOUR PRN Sadie Brooks M.D.   5 mg at 01/17/21 0135   • piperacillin-tazobactam (ZOSYN) 4.5 g in  mL IVPB  4.5 g Intravenous Once Orestes Ernandez M.D.        And   • piperacillin-tazobactam (ZOSYN) 4.5 g in  mL IVPB  4.5 g Intravenous Q8HRS Orestes Ernandez M.D.       • MD Alert...Vancomycin per Pharmacy   Other PHARMACY TO DOSE Orestes Ernandez M.D.       • artificial tears (EYE LUBRICANT) ophth ointment 1 Application  1 Application Both Eyes Q8HRS Orestes Ernandez M.D.       • vecuronium (NORCURON) injection 6.42 mg  0.1 mg/kg Intravenous Once Orestes Ernandez M.D.       • vecuronium (NORCURON) 60 mg in 5% dextrose 500 mL Infusion  0-1.7 mcg/kg/min Intravenous Continuous Orestes Ernandez M.D.       • vecuronium (NORCURON) injection 6.42 mg  0.1 mg/kg  Intravenous Q2HRS PRN Orestes Ernandez M.D.       • fentaNYL (SUBLIMAZE) 50 mcg/mL in 50mL (Continuous Infusion)   Intravenous Continuous Orestes Ernandez M.D.       • propofol (DIPRIVAN) injection  0-80 mcg/kg/min Intravenous Continuous Orestes Ernandez M.D.       • famotidine (PEPCID) tablet 20 mg  20 mg Enteral Tube Q12HRS Sadie Brooks M.D.        Or   • famotidine (PEPCID) injection 20 mg  20 mg Intravenous Q12HRS Sadie rBooks M.D.   20 mg at 01/17/21 0513   • MD Alert...ICU Electrolyte Replacement per Pharmacy   Other PHARMACY TO DOSE Sadie Brooks M.D.       • lidocaine (XYLOCAINE) 1 % injection 1-2 mL  1-2 mL Tracheal Tube Q30 MIN PRN Sadie Brooks M.D.       • fentaNYL (SUBLIMAZE) injection 25 mcg  25 mcg Intravenous Q HOUR PRN Sadie Brooks M.D.        Or   • fentaNYL (SUBLIMAZE) injection 50 mcg  50 mcg Intravenous Q HOUR PRN Sadie Brooks M.D.        Or   • fentaNYL (SUBLIMAZE) injection 100 mcg  100 mcg Intravenous Q HOUR PRN Sadie Brooks M.D.   100 mcg at 01/17/21 0050   • vasopressin (VASOSTRICT) 20 Units in  mL Infusion  0.03 Units/min Intravenous Continuous Orestes Ernandez M.D. 9 mL/hr at 01/16/21 1759 0.03 Units/min at 01/16/21 1759   • phenytoin (DILANTIN) injection 200 mg  200 mg Intravenous Q8HRS Orestes Ernandez M.D.   200 mg at 01/17/21 0514   • acetaminophen (Tylenol) tablet 650 mg  650 mg Enteral Tube Q6HRS PRN Orestes Ernandez M.D.       • levETIRAcetam (Keppra) 500 mg in 100 mL NaCl IV premix  500 mg Intravenous Q12HRS Orestes Ernandez M.D.   Stopped at 01/17/21 0528   • Pharmacy Consult: Enteral tube insertion - review meds/change route/product selection   Other PHARMACY TO DOSE Orestes Ernandez M.D.       • ondansetron (ZOFRAN ODT) dispertab 4 mg  4 mg Enteral Tube Q6HRS PRN Orestes Ernandez M.D.       • haloperidol (HALDOL) tablet 2 mg  2 mg Enteral Tube BID Orestes Ernandez M.D.   2 mg at 01/17/21 0513   • enoxaparin (LOVENOX) inj 40 mg  40 mg Subcutaneous DAILY Orestes  CORY Ernandez M.D.   40 mg at 01/17/21 0513   • ondansetron (ZOFRAN) syringe/vial injection 4 mg  4 mg Intravenous Q6HRS PRN Fred Roman M.D.       • labetalol (NORMODYNE/TRANDATE) injection 10 mg  10 mg Intravenous Q6HRS PRN Fred Roman M.D.       • Respiratory Therapy Consult   Nebulization Continuous RT Fred Roman M.D.       • ipratropium-albuterol (DUONEB) nebulizer solution  3 mL Nebulization Q2HRS PRN (RT) Fred Roman M.D.       • senna-docusate (PERICOLACE or SENOKOT S) 8.6-50 MG per tablet 2 Tab  2 Tab Enteral Tube BID Fred Roman M.D.   2 Tab at 01/17/21 0513    And   • polyethylene glycol/lytes (MIRALAX) PACKET 1 Packet  1 Packet Enteral Tube QDAY PRN Fred Roman M.D.        And   • magnesium hydroxide (MILK OF MAGNESIA) suspension 30 mL  30 mL Enteral Tube QDAY PRN Fred Roman M.D.        And   • bisacodyl (DULCOLAX) suppository 10 mg  10 mg Rectal QDAY PRN Fred Roman M.D.       • norepinephrine (Levophed) infusion 8 mg/250 mL (premix)  0-30 mcg/min Intravenous Continuous Orestes Ernandez M.D. 30 mL/hr at 01/17/21 0744 16 mcg/min at 01/17/21 0744       Fluids    Intake/Output Summary (Last 24 hours) at 1/17/2021 0815  Last data filed at 1/17/2021 0722  Gross per 24 hour   Intake 2041.46 ml   Output 555 ml   Net 1486.46 ml       Laboratory  Recent Labs     01/16/21  0209 01/16/21  0903 01/17/21  0133   ISTATAPH 7.289* 7.330* 7.247*   ISTATAPCO2 53.6* 50.1* 72.0*   ISTATAPO2 83 53* 67   ISTATATCO2 27 28 34*   [id number] 95 84* 89*   ISTATARTHCO3 25.7* 26.4* 31.4*   ISTATARTBE -2 0 2   ISTATTEMP 36.2 C 97.3 F 37.1 C   ISTATFIO2 80 50 60   ISTATSPEC Arterial Arterial Arterial   ISTATAPHTC 7.300* 7.341* 7.246*   [id number] 79 50* 68         Recent Labs     01/15/21  0240 01/16/21  0525 01/17/21  0525   SODIUM 139 136 134*   POTASSIUM 3.4* 4.2 4.5   CHLORIDE 108 104 100   CO2 23 23 24   BUN 16 11 18   CREATININE 0.64 0.41* 0.73   MAGNESIUM 2.1 2.2 2.1   PHOSPHORUS 1.8* 3.2 3.7    CALCIUM 7.7* 7.5* 7.8*     Recent Labs     01/15/21  0240 01/16/21  0525 01/17/21  0525   GLUCOSE 123* 153* 161*     Recent Labs     01/15/21  0240 01/16/21  0525 01/17/21  0525   WBC 17.7* 21.1* 6.5   NEUTSPOLYS 89.90* 86.10* 71.30   LYMPHOCYTES 3.50* 3.60* 1.60*   MONOCYTES 4.20 5.30 2.50   EOSINOPHILS 0.80 2.90 0.00   BASOPHILS 0.30 0.60 0.80     Recent Labs     01/15/21  0240 01/16/21  0525 01/17/21  0525   RBC 4.46* 4.28* 4.89   HEMOGLOBIN 13.7* 12.8* 14.8   HEMATOCRIT 40.6* 39.7* 47.3   PLATELETCT 276 272 246       Imaging  X-Ray:  I have personally reviewed the images and compared with prior images.  EKG:  I have personally reviewed the images and compared with prior images.  Echo:   Reviewed  Ultrasound:  Reviewed    Assessment/Plan  * Acute respiratory failure with hypoxia (HCC)  Assessment & Plan  Secondary to COVID-19 pneumonia  Progressive hypoxia failing HFNC and intubated 1/12 prior to transport  Continue full ventilator support, LTV/LPS, wean as tolerated, all ventilator bundles in place  Follow-up chest imaging adequate  Extubated 1/13, high flow nc  High risk re-intubation, reintubated 1/15 in am  Bronchoscopy due to continued secretions  Sedation required due to underlying psychosis contributory likely  Paralyzed and planning proning with vent asynchrony    Pneumonia due to COVID-19 virus  Assessment & Plan  Initially COVID-19 +12/24-rain  Completed 2 courses of remdesivir (11 doses)  Remains on corticosteroids, currently methylprednisolone 40 3 times daily  Follow-up chest imaging, monitor for secondary infections  Completed 2 course of antibiotics initially with C3/azithromycin, then levofloxacin  We will repeat sputum culture, MRSA nares  Full dose anticoagulation for COVID-19 pneumonia with high D-dimer 20 on admission  Reintubation 1/15  Planned paralyzing and proning      DNAR (do not attempt resuscitation)  Assessment & Plan  Changed code status to DNAR, CPR in coronavirus 19 on mechanical  ventilator nearly 100% mortality, CPR non beneficial in this patient.  Changed to DNAR and intubation ok.    Acute encephalopathy  Assessment & Plan  Secondary to hypoxia  Complicated by underlying psychosis  No focal deficits  qtc prolonged, 510 initial, decreased  Scheduled haldol bid, on daily at home  Required re intubation      Essential hypertension  Assessment & Plan  BP control    Seizure disorder (HCC)  Assessment & Plan  Continue phenytoin       VTE:  Lovenox  Ulcer: H2 Antagonist  Lines: Huang Catheter  Ongoing indication addressed    I have performed a physical exam and reviewed and updated ROS and Plan today (1/17/2021). In review of yesterday's note (1/16/2021), there are no changes except as documented above.     Discussed patient condition and risk of morbidity and/or mortality with RN, RT, Pharmacy, Code status disscussed and Charge nurse / hot rounds     The patient remains critically ill.  Remains on mechanical ventilator.  Propofol and fentanyl for sedation.  Levophed for map > 65 and sbp > 90.  Critical care time = 55 minutes in directly providing and coordinating critical care and extensive data review.  No time overlap and excludes procedures.

## 2021-01-17 NOTE — PROGRESS NOTES
Pulmonary/Critical Care Medicine   Progress Note    Date of service: 1/17/2021  Time: 01:20    Notified by RN that patient still dyssynchronous with ventilator with O2 desaturations despite an increase of Fentanyl to 400mcg/hr and propofol to 80.    Will give Versed 5mg IVP every hour for agitation, one time dose of rocuronium 100mg IVP, and increase the fentanyl basal rate to 600mcg/hr.  Conveyed to RN to try versed and rocuronium first prior to increase fentanyl.    Despite excellent sedation and synchrony with the ventilator, patient still with O2 sats in mid 80s.  Increased PEEP to 10 with improvement to 90-92%    Saide Brooks MD  Pulmonary and Critical Care Medicine

## 2021-01-18 NOTE — PROGRESS NOTES
MD order and indication verified. Rectal tone assessed.  Balloon inflated with 42 mL of water and patency assessed. BMS then flushed with 100 mL of water. Stool return present. Bedside RN educated regarding flushing BMS Qshift and need for BMS supersuser or wound care RN for balloon adjustments. Rectal tube order set in place.

## 2021-01-18 NOTE — CARE PLAN
Problem: Ventilation Defect:  Goal: Ability to achieve and maintain unassisted ventilation or tolerate decreased levels of ventilator support  Outcome: NOT MET      Ventilator Daily Summary    Vent Day 3    Ventilator settings changed this shift: None    Weaning trials: None    Respiratory Procedures: None    Plan: Continue current ventilator settings and wean mechanical ventilation as tolerated per physician orders.

## 2021-01-18 NOTE — CARE PLAN
Problem: Nutritional:  Goal: Nutrition support tolerated and meeting greater than 85% of estimated needs  Outcome: PROGRESSING AS EXPECTED   Refer to RD note.

## 2021-01-18 NOTE — PROGRESS NOTES
"Critical Care Progress Note  Crisis Standard of Care Documentation   Date of admission  1/12/2021    Chief Complaint  65 y.o. male admitted 1/12/2021 with covid 19 pneumonia.  Multiple hospitalizations with remdesivir two courses and decadron.  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  Required intubation on transfer 1/12.    Per Dr Roman consult note: 65 y.o. male who presented 1/12/2021 as direct admission for worsening COVID-19 pneumonia and respiratory failure from [facility]; currently inpatient status since 1/3/2021 at [facility].  Patient is a 65-year-old male, inmate at the correctional facility there, history of seizure disorder and remote IVDA and smoking history.  He presented just before [facility] and subsequent acquired hospitalization and oxygen.  He was discharged back to the facility on January 2 but had to be immediately readmitted on January 3 with increasing respiratory distress, initially placed on CPAP.  He has been treated with 2 courses of remdesivir (11 total doses), IV corticosteroids, currently on Solu-Medrol 40 mg every 8 hours, 2 different courses of antibiotics currently discontinued and full dose anticoagulation since admission with full dose Lovenox for elevated D-dimer of 20, subsequently dropped to 2.5.  CT and chest x-rays showed bilateral alveolar infiltrates consistent with Covid pneumonia.  He has not developed renal failure or hepatic failure but has developed some \"Covid toes.\"  Over the past 2 to 3 days has had increasing oxygen requirements, currently on Vapotherm (HFNC) 40 L, 100% with oxygen desaturation to the mid 80s.  He initially declined intubation but now would accept a time-limited trial of intubation per report.  He was intubated for transport.      Hospital Course  No notes on file  1/12 intubated on transfer to this facility  1/13 on mechanical ventilator    Interval Problem Update  Reviewed last 24 hour events:  Remains critically ill   Sedation with propofol and " fentanyl gtt  On full vent support AC VC+ 100%/PEEP 12  ABG overnight was 7.09/84/73. Rate was increased to 34  Repeat ABG with 7.14/73/51, saturation 93%  Decreased PEEP to 10, increase TV to 350cc (5.5cc/kg), repeat ABG in 1 hour  Off rocuronium  HR in 90-100s  MAP >65  On NE gtt, vasopressin and phenylephrine gtt to keep MAP >65  On hydrocortisone 100 Q8H  Afebrile  WBC 25.6   On piptazo and vanco. MRSA negative  Creatinine 2.7 (from 0.73)  IO +1.4L in the past 24 hours, +5L since admission  K 5.7, sodium 136  LFT elevated with total bili 0.3  FMS in place with watery brown stools (pt is on senna)      Review of Systems  Review of Systems   Unable to perform ROS: Intubated        Vital Signs for last 24 hours   Temp:  [35.8 °C (96.4 °F)-38 °C (100.4 °F)] 36.1 °C (97 °F)  Pulse:  [] 104  Resp:  [18-36] 34  BP: (102-144)/(53-82) 135/64  SpO2:  [89 %-100 %] 99 %    Hemodynamic parameters for last 24 hours       Respiratory Information for the last 24 hours  Vent Mode: APVCMV  Rate (breaths/min): 34  Vt Target (mL): 370  PEEP/CPAP: 12  MAP: 18  Control VTE (exp VT): 348    Physical Exam   Physical Exam  Vitals signs and nursing note reviewed.   Constitutional:       Appearance: He is ill-appearing.      Comments: Intubated and sedated RASS -5   HENT:      Nose: No congestion or rhinorrhea.      Mouth/Throat:      Comments: ET tube in place  Eyes:      General: No scleral icterus.     Pupils: Pupils are equal, round, and reactive to light.   Cardiovascular:      Rate and Rhythm: Normal rate and regular rhythm.      Pulses: Normal pulses.      Heart sounds: Normal heart sounds. No murmur.   Pulmonary:      Effort: No respiratory distress.      Breath sounds: No wheezing or rales.   Abdominal:      General: There is no distension.      Palpations: There is mass.      Tenderness: There is no abdominal tenderness. There is no guarding.   Genitourinary:     Comments: Huang in place  FMS in place with watery brown  stool  Musculoskeletal:      Right lower leg: No edema.      Left lower leg: No edema.      Comments: Mottled in bilateral feet  Cold to touch bilateral  Cyanotic in bilateal toes (more in right 1st-3rd toes)   Skin:     Capillary Refill: Capillary refill takes more than 3 seconds.      Findings: No rash.      Comments: Tattoos all over     Neurological:      Comments: Intubated and sedated  Unable to obtain         Medications  Current Facility-Administered Medications   Medication Dose Route Frequency Provider Last Rate Last Admin   • [START ON 1/19/2021] famotidine (PEPCID) tablet 20 mg  20 mg Enteral Tube DAILY Remy Mares D.O.        Or   • [START ON 1/19/2021] famotidine (PEPCID) injection 20 mg  20 mg Intravenous DAILY Remy Mares D.O.       • midazolam (Versed) 2 MG/2ML injection 5 mg  5 mg Intravenous Q HOUR PRN Sadie Brooks M.D.   5 mg at 01/17/21 0135   • piperacillin-tazobactam (ZOSYN) 4.5 g in  mL IVPB  4.5 g Intravenous Q8HRS Orestes Ernandez M.D. 25 mL/hr at 01/18/21 0500 4.5 g at 01/18/21 0500   • MD Alert...Vancomycin per Pharmacy   Other PHARMACY TO DOSE Orestes Ernandez M.D.       • phenylephrine (PAULINA-SYNEPHRINE) 40 mg in  mL Infusion  0-300 mcg/min Intravenous Continuous Orestes Ernandez M.D. 9.4 mL/hr at 01/17/21 1554 25 mcg/min at 01/17/21 1554   • levETIRAcetam (KEPPRA) tablet 500 mg  500 mg Enteral Tube BID Orestes Ernandez M.D.   500 mg at 01/18/21 0527   • propofol (DIPRIVAN) injection  0-80 mcg/kg/min Intravenous Continuous Orestes Ernandez M.D. 9.6 mL/hr at 01/18/21 0418 25 mcg/kg/min at 01/18/21 0418   • fentaNYL (SUBLIMAZE) 50 mcg/mL in 50mL (Continuous Infusion)   Intravenous Continuous Orestes Ernandez M.D. 5 mL/hr at 01/17/21 1646 250 mcg at 01/18/21 0449   • hydrocortisone sodium succinate PF (SOLU-CORTEF) 100 MG injection 100 mg  100 mg Intravenous Q8HRS Orestes Ernandez M.D.   100 mg at 01/18/21 0527   • MD Alert...ICU Electrolyte Replacement per Pharmacy   Other PHARMACY TO  DOSE Sadie Brooks M.D.       • lidocaine (XYLOCAINE) 1 % injection 1-2 mL  1-2 mL Tracheal Tube Q30 MIN PRN Sadie Brooks M.D.       • fentaNYL (SUBLIMAZE) injection 25 mcg  25 mcg Intravenous Q HOUR PRN Sadie Brooks M.D.        Or   • fentaNYL (SUBLIMAZE) injection 50 mcg  50 mcg Intravenous Q HOUR PRN Sadie Brooks M.D.        Or   • fentaNYL (SUBLIMAZE) injection 100 mcg  100 mcg Intravenous Q HOUR PRN Sadie Brooks M.D.   100 mcg at 01/18/21 0408   • vasopressin (VASOSTRICT) 20 Units in  mL Infusion  0.03 Units/min Intravenous Continuous Orestes Ernandez M.D.   Stopped at 01/17/21 2147   • phenytoin (DILANTIN) injection 200 mg  200 mg Intravenous Q8HRS Orestes Ernandez M.D.   200 mg at 01/18/21 0600   • acetaminophen (Tylenol) tablet 650 mg  650 mg Enteral Tube Q6HRS PRN Orestes Ernandez M.D.   650 mg at 01/18/21 0417   • Pharmacy Consult: Enteral tube insertion - review meds/change route/product selection   Other PHARMACY TO DOSE Orestes Ernandez M.D.       • ondansetron (ZOFRAN ODT) dispertab 4 mg  4 mg Enteral Tube Q6HRS PRN Orestes Ernandez M.D.       • haloperidol (HALDOL) tablet 2 mg  2 mg Enteral Tube BID Orestes Ernandez M.D.   2 mg at 01/18/21 0600   • enoxaparin (LOVENOX) inj 40 mg  40 mg Subcutaneous DAILY Orestes Ernandez M.D.   40 mg at 01/18/21 0527   • ondansetron (ZOFRAN) syringe/vial injection 4 mg  4 mg Intravenous Q6HRS PRN Fred Roman M.D.       • labetalol (NORMODYNE/TRANDATE) injection 10 mg  10 mg Intravenous Q6HRS PRN Fred Roman M.D.       • Respiratory Therapy Consult   Nebulization Continuous RT Fred Roman M.D.       • ipratropium-albuterol (DUONEB) nebulizer solution  3 mL Nebulization Q2HRS PRN (RT) Fred Roman M.D.       • senna-docusate (PERICOLACE or SENOKOT S) 8.6-50 MG per tablet 2 Tab  2 Tab Enteral Tube BID Fred Roman M.D.   2 Tab at 01/18/21 0527    And   • polyethylene glycol/lytes (MIRALAX) PACKET 1 Packet  1 Packet Enteral Tube QDAY PRN  Fred Roman M.D.        And   • magnesium hydroxide (MILK OF MAGNESIA) suspension 30 mL  30 mL Enteral Tube QDAY PRN Fred Roman M.D.        And   • bisacodyl (DULCOLAX) suppository 10 mg  10 mg Rectal QDAY PRN Fred Roman M.D.       • norepinephrine (Levophed) infusion 8 mg/250 mL (premix)  0-30 mcg/min Intravenous Continuous Orestes Ernandez M.D. 33.8 mL/hr at 01/18/21 0543 18 mcg/min at 01/18/21 0543       Fluids    Intake/Output Summary (Last 24 hours) at 1/18/2021 0822  Last data filed at 1/18/2021 0600  Gross per 24 hour   Intake 1553.67 ml   Output 935 ml   Net 618.67 ml       Laboratory  Recent Labs     01/16/21  0903 01/17/21  0133 01/18/21  0255   ISTATAPH 7.330* 7.247* 7.094*   ISTATAPCO2 50.1* 72.0* 84.1*   ISTATAPO2 53* 67 73   ISTATATCO2 28 34* 28   EMDMHIX9UEP 84* 89* 86*   ISTATARTHCO3 26.4* 31.4* 25.8*   ISTATARTBE 0 2 -6*   ISTATTEMP 97.3 F 37.1 C 39.5 C   ISTATFIO2 50 60 100   ISTATSPEC Arterial Arterial Arterial   ISTATAPHTC 7.341* 7.246* 7.063*   [id number] 50* 68 87         Recent Labs     01/16/21  0525 01/17/21  0525 01/18/21  0540   SODIUM 136 134* 136   POTASSIUM 4.2 4.5 5.7*   CHLORIDE 104 100 101   CO2 23 24 22   BUN 11 18 40*   CREATININE 0.41* 0.73 2.37*   MAGNESIUM 2.2 2.1 2.6*   PHOSPHORUS 3.2 3.7 5.2*   CALCIUM 7.5* 7.8* 7.8*     Recent Labs     01/16/21  0525 01/17/21  0525 01/18/21  0540   ALTSGPT  --   --  327*   ASTSGOT  --   --  779*   ALKPHOSPHAT  --   --  110*   TBILIRUBIN  --   --  0.3   PREALBUMIN  --   --  5.4*   GLUCOSE 153* 161* 215*     Recent Labs     01/16/21  0525 01/17/21  0525 01/18/21  0540   WBC 21.1* 6.5 25.6*   NEUTSPOLYS 86.10* 71.30 70.60   LYMPHOCYTES 3.60* 1.60* 1.70*   MONOCYTES 5.30 2.50 3.40   EOSINOPHILS 2.90 0.00 0.00   BASOPHILS 0.60 0.80 0.00   ASTSGOT  --   --  779*   ALTSGPT  --   --  327*   ALKPHOSPHAT  --   --  110*   TBILIRUBIN  --   --  0.3     Recent Labs     01/16/21  0525 01/17/21  0525 01/17/21  0850 01/18/21  0540   RBC 4.28*  4.89  --  4.26*   HEMOGLOBIN 12.8* 14.8  --  12.8*   HEMATOCRIT 39.7* 47.3  --  42.2   PLATELETCT 272 246  --  237   FERRITIN  --   --  2280.0*  --        Imaging  X-Ray:  I have personally reviewed the images and compared with prior images.  EKG:  I have personally reviewed the images and compared with prior images.  Echo:   Reviewed  Ultrasound:  Reviewed    Assessment/Plan  * Acute respiratory failure with hypoxia (HCC)  Severe COVID pneumonia  Septic Shock  MADYSON  Seizures     Assessment & Plan  Secondary to severe COVID-19 pneumonia  Intubated 1/12  Extubated 1/13, reintubated 1/15  ABG with mixed both resp and metabolic acidosis  Pt also in shock with increasing pressors requirements, febrile and increasing leukocytosis  Creatinine rising, likely ATN due to hypotension/shock  Pt also has watery stools - however pt has been on laxatives. Will hold off ruling out CDiff    Plan:   Continue full vent support  Goal sat >85%, pH >7.25  Continue NE, vasopressin to keep MAP >65  Continue hydrocortisone  Start piptazo  Repeat blood cultures x2  Repeat lactate was 3.8, slowly trending down to 2.6  Monitor UOP  Discontinue laxatives. If pt remains having diarrhea despite off laxatives, will send for CDiff and empirically treat for CDiff.   Continue antiseizure meds    Overall very poor prognosis in patient who has severe COVID pneumonia, intubated with multiorgan failure. Code status was made DNR. Today I talked to [facility] Physician, Dr. Reina at [phone] regarding surrogate decision maker. I updated and explained about pt's critical condition. He stated he will talk to patient's families and get back to us.        VTE:  Lovenox  Ulcer: H2 Antagonist  Lines: Huang Catheter  Ongoing indication addressed  Nutrition: on tube feed, goal -180  Mobility: not candidate due to HD instability   Code status: DNR    I have performed a physical exam and reviewed and updated ROS and Plan today (1/18/2021). In review  of yesterday's note (1/17/2021), there are no changes except as documented above.     Discussed patient condition and risk of morbidity and/or mortality with RN, RT, Pharmacy, Code status disscussed and Charge nurse / hot rounds     Critical care time = 80 minutes in directly providing and coordinating critical care and extensive data review.  No time overlap and excludes procedures.

## 2021-01-18 NOTE — WOUND TEAM
"Wound consult placed on 1/17/21 for BMS placement. Confirmed \"Insert rectal tube\" in place. Confirmed Rectal tube placed by super-user and appropriate LDA opened. This RN placed \"rectal tube care\" order. Wound consult then completed and pt placed on appropriate follow up lists.     "

## 2021-01-18 NOTE — DIETARY
Nutrition Services: Update   Day 6 of admit.  Pt currently with Impact Peptide 1.5 running @ 35 mL/hr (rate with Propofol).  Propofol infusion has since weaned down to 25 mcg/kg/min (9.6 mL/hr), providing 253 kcal.  Labs from today reviewed - No Nephrology consult @ this time.  Will adjust TF formula and goal rates to meet estimated kcal needs and provide lower end of protein needs 2/2 decreased renal fxn.     Recommendations/Plan:  1. With Propofol: Novasource Renal @ 30 mL/hr, providing 1440 kcal (+kcal from Propofol, 65 gm protein, 132 gm CHO, and 518 mL of free water per day.   2. Fluids per MD.    3. When Propofol d/c'd: advance Novasource Renal to final goal rate of 35 mL/hr, providing 1680 kcal, 76 gm protein, 154 gm CHO, and 605 mL of free water per day.   4. Continue to monitor wt.  5. RD continues to monitor labs and Propofol infusions daily.     RD following

## 2021-01-18 NOTE — RESPIRATORY CARE
Vent Day # 3     Ventilator settings changed this shift:yes   made this shift. RR increase to 34 per MD due to ABG result.     34/370/10/60%,  8.0 @ 24     Respiratory Procedures:         Plan: Continue current ventilator settings and wean mechanical ventilation as tolerated per physician orders.

## 2021-01-18 NOTE — FLOWSHEET NOTE
Sedation vacation started at 1915, Propofol at 40-stopped, Fentanyl at 250-stopped.    1920, patient remains obtunded, no withdraw, PERRLA    1930-patient remains obtunded, no withdraw, PERRLA    1940-Patient remains obtunded, no withdraw, PERRLA    1945-Patient awakening, moves all extremities, follows commands, PERRLA    1950- Sedation restarted, patient shows no obvious signs of distress.    1955-Intensivist notified of sedation vacation and patient response; CT head placed on hold

## 2021-01-19 PROBLEM — Z71.89 COUNSELING REGARDING END OF LIFE DECISION MAKING: Status: ACTIVE | Noted: 2021-01-01

## 2021-01-19 PROBLEM — R65.21 SEPTIC SHOCK (HCC): Status: ACTIVE | Noted: 2021-01-01

## 2021-01-19 PROBLEM — R45.1 AGITATION REQUIRING SEDATION PROTOCOL: Status: ACTIVE | Noted: 2021-01-01

## 2021-01-19 PROBLEM — A41.9 SEPTIC SHOCK (HCC): Status: ACTIVE | Noted: 2021-01-01

## 2021-01-19 PROBLEM — N17.9 AKI (ACUTE KIDNEY INJURY) (HCC): Status: ACTIVE | Noted: 2021-01-01

## 2021-01-19 NOTE — ASSESSMENT & PLAN NOTE
Titrate fentanyl for pain < 2  Titrate propofol for RASS 0 to -2  Daily sedation holiday when able  Follow [facility] PAD guidelines

## 2021-01-19 NOTE — DISCHARGE PLANNING
Care Transition Team Discharge Planning    Anticipated Discharge Disposition: TBD    Action: Lsw received IPCSS requesting f/u w/ pt's family. Pt is a prisoner from [place]. Lsw met w/ guards near bedside. They indicate [facility] MD spoke to the [facility] MD and family has been in touch w/ medical team to assist w/ decision making.     Barriers to Discharge: TBD    Plan: f/u w/ medical team, CCA, etc.

## 2021-01-19 NOTE — WOUND TEAM
"[facility]  Inpatient Services  Initial Wound and Skin Care Evaluation    Admission Date: 1/12/2021     Last order of IP CONSULT TO WOUND CARE was found on 1/19/2021 from Hospital Encounter on 1/12/2021     No past medical history on file.  No past surgical history on file.  Social History     Tobacco Use   • Smoking status: Not on file   Substance Use Topics   • Alcohol use: Not on file     No chief complaint on file.    Diagnosis: Acute hypoxemic respiratory failure due to COVID-19 ([facility]) [U07.1, J96.01]    Unit where seen by Wound Team: T616/00     WOUND CONSULT/FOLLOW UP RELATED TO:  Right ear, bilateral feet/shins, Coccyx, Lip     Self Report / Pain Level:  No SS of pain or discomfort       WOUND HISTORY:  Pt is an older gentleman directly admitted from [facility] with covid 19 infection. Pt was initially on high flow nasal cannula on 40L 100% oxygenation at outside facility initially refusing intubation but changed his mind and was intubated for transport to [facility]. Pt was admitted to [facility]. Right ear has a wound possibly POA Pressure injury related to high flow from outside facility vs. Irregular unknown discoloration. Pt has \"covid toes\" vs. Discoloration related to pressor use. Pts sacrococcygeal area appeared overnight per nursing. Due to rapid presence and shape and color wound appears to be related to skin failure, pt is critically ill with multi organ failure. Pt has rapidly declined with BP as low as 50s/30s with turns.     WOUND ASSESSMENT/LDA  Wound 01/19/21 Soft Tissue Necrosis Toe, Hallux;Toe, 2nd;Toe, 3rd;Toe, 4th;Toe, 5th;Foot;Leg Bilateral Related to pressors (Active)   Wound Image      01/19/21 1500   Site Assessment Purple    Periwound Assessment Clean;Dry;Intact    Margins Attached edges;Defined edges    Closure Open to air    Drainage Amount None    Treatments Offloading    Wound Cleansing Not Applicable    Dressing Cleansing/Solutions Not Applicable    Dressing Options " Open to Air    NEXT Weekly Photo (Inpatient Only) 01/26/21    Non-staged Wound Description Full thickness    Shape Irregular    Wound Odor None    Exposed Structures SHANEKA    WOUND NURSE ONLY - Time Spent with Patient (mins) 90    Number of days: 0       Wound 01/19/21 Full Thickness Wound Sacrum Skin Failure  (Active)   Wound Image    01/19/21 1500   Site Assessment Purple    Periwound Assessment Clean;Dry;Intact    Margins Attached edges;Defined edges    Closure Adhesive bandage    Drainage Amount None    Treatments Cleansed;Offloading;Site care    Wound Cleansing Foam Cleanser/Washcloth    Periwound Protectant Barrier Paste    Dressing Cleansing/Solutions Not Applicable    Dressing Options Mepilex    Dressing Changed New    Dressing Status Intact;Dry;Clean    Dressing Change/Treatment Frequency Every 72 hrs, and As Needed    NEXT Dressing Change/Treatment Date 01/22/21    NEXT Weekly Photo (Inpatient Only) 01/26/21    Non-staged Wound Description Full thickness    Wound Length (cm) 9.5 cm    Wound Width (cm) 8 cm    Wound Surface Area (cm^2) 76 cm^2    Shape Butterfly    Wound Odor None    Exposed Structures SHANEKA    Number of days: 0       Wound 01/19/21 Other (comment) Ear, Right unknown etiology vs. POA DTI  (Active)   Wound Image   01/19/21 1500   Site Assessment Red    Periwound Assessment Clean;Dry;Intact    Margins Attached edges;Defined edges    Closure Open to air    Drainage Amount None    Treatments Cleansed;Site care;Offloading    Wound Cleansing Not Applicable    Dressing Cleansing/Solutions Not Applicable    Dressing Options Open to Air    NEXT Weekly Photo (Inpatient Only) 01/26/21    Non-staged Wound Description Partial thickness    Wound Length (cm) 0.2 cm    Wound Width (cm) 0.7 cm    Wound Depth (cm) 0 cm    Wound Surface Area (cm^2) 0.14 cm^2    Wound Volume (cm^3) 0 cm^3    Shape Irregular    Wound Odor None    Exposed Structures None    Number of days: 0      Vascular:    SERGIO:   No results  found.    Lab Values:    Lab Results   Component Value Date/Time    WBC 25.9 (H) 01/19/2021 05:32 AM    RBC 4.07 (L) 01/19/2021 05:32 AM    HEMOGLOBIN 12.4 (L) 01/19/2021 05:32 AM    HEMATOCRIT 39.5 (L) 01/19/2021 05:32 AM    CREACTPROT 37.83 (H) 01/18/2021 05:40 AM        Culture Results show:  No results found for this or any previous visit (from the past 720 hour(s)).    INTERVENTIONS BY WOUND TEAM:  Chart and images reviewed. Discussed with bedside RN. This RN in to assess patient. Performed standard wound care which includes appropriate positioning, dressing removal and non-selective debridement.   Cleansed with:  Sacrum: moist warm washcloth. Right Ear: NS and gauze. Bilateral Feet: Not cleansed  Sharp debridement: NA  Sarah wound: Cleansed with same as above per each site, Prepped with NA  Primary Dressing: Sacrum: Barrier paste, Right Ear: Offloading measures, Bilateral feet: Offloading measures  Secondary (Outer) Dressing: Sacrum: Sacral mepilex, Right ear and bilateral feet: Offloading measures    Interdisciplinary consultation: Patient, Bedside RN (Skyler), Wound RN (Andra) - via picture review, Unit Leadership (Willie Cosby)    EVALUATION / RATIONALE FOR TREATMENT:  Most Recent Date:  1/19/21:  BMS in place as well as offloading measures. Pts bilateral feet open to air with offloading measures. Right ear with no pressure overlying the area and was left open to air.      Goals: Steady decrease in wound area and depth weekly.    NURSING PLAN OF CARE ORDERS (X):    Dressing changes: See Dressing Care orders: X  Skin care: See Skin Care orders:   RN Prevention Protocol:   Rectal tube care: See Rectal Tube Care orders:   Other orders:    RSKIN:   CURRENTLY IN PLACE (X), APPLIED THIS VISIT (A), ORDERED (O):   Q shift Donell:  X  Q shift pressure point assessments:  X    Surface/Positioning   Pressure redistribution mattress            Low Airloss  X, ICU Bed        Bariatric foam      Bariatric DEIRDRE        Waffle cushion        Waffle Overlay  X        Reposition q 2 hours   X   TAPs Turning system     Z Teja Pillow     Offloading/Redistribution   Sacral Mepilex (Silicone dressing)   X  Heel Mepilex (Silicone dressing)         Heel float boots (Prevalon boot)   X          Float Heels off Bed with Pillows     X      Respiratory   Silicone O2 tubing         Gray Foam Ear protectors     Cannula fixation Device (Tender )          High flow offloading Clip    Elastic head band offloading device      Anchorfast    X                                                     Trach with Optifoam split foam             Containment/Moisture Prevention     Rectal tube or BMS  X  Purwick/Condom Cath        Huang Catheter  X  Barrier wipes           Barrier paste  X     Antifungal tx      Interdry        Mobilization NA critically ill      Up to chair        Ambulate      PT/OT      Nutrition       Dietician        Diabetes Education      PO     TF  X   TPN     NPO   # days     Other        WOUND TEAM PLAN OF CARE:   Dressing changes by wound team:                   Follow up 3 times weekly:                NPWT change 3 times weekly:     Follow up 1-2 times weekly:      Follow up Bi-Monthly:                   Follow up as needed:     X  Other (explain):     Anticipated discharge plans:   LTACH:        SNF/Rehab: X                 Home Health Care:           Outpatient Wound Center:            Self/Family Care:

## 2021-01-19 NOTE — ASSESSMENT & PLAN NOTE
Dr. Wasserman: (1/18/21) Overall very poor prognosis in patient who has severe COVID pneumonia, intubated with multiorgan failure. Code status was made DNR. Today I talked to [facility] Physician, Dr. Reina at [phone] regarding surrogate decision maker.  I updated and explained about pt's critical condition.  He stated he will talk to patient's families and get back to us.

## 2021-01-19 NOTE — PROGRESS NOTES
"Critical Care Progress Note  Crisis Standards of Care Documentation    Date of admission  1/12/2021    Chief Complaint  65 y.o. male admitted 1/12/2021 with covid 19 pneumonia.  Multiple hospitalizations with remdesivir two courses and decadron.  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  Required intubation on transfer 1/12.     Per Dr Roman consult note: 65 y.o. male who presented 1/12/2021 as direct admission for worsening COVID-19 pneumonia and respiratory failure from [facility]; currently inpatient status since 1/3/2021 at [facility].  Patient is a 65-year-old male, inmate at the correctional facility there, history of seizure disorder and remote IVDA and smoking history.  He presented just before [place] and subsequent acquired hospitalization and oxygen.  He was discharged back to the facility on January 2 but had to be immediately readmitted on January 3 with increasing respiratory distress, initially placed on CPAP.  He has been treated with 2 courses of remdesivir (11 total doses), IV corticosteroids, currently on Solu-Medrol 40 mg every 8 hours, 2 different courses of antibiotics currently discontinued and full dose anticoagulation since admission with full dose Lovenox for elevated D-dimer of 20, subsequently dropped to 2.5.  CT and chest x-rays showed bilateral alveolar infiltrates consistent with Covid pneumonia.  He has not developed renal failure or hepatic failure but has developed some \"Covid toes.\"  Over the past 2 to 3 days has had increasing oxygen requirements, currently on Vapotherm (HFNC) 40 L, 100% with oxygen desaturation to the mid 80s.  He initially declined intubation but now would accept a time-limited trial of intubation per report.  He was intubated for transport.     Hospital Course  1/12 intubated on transfer to this facility  1/13 on mechanical ventilator  1/18 worsening respiratory acidosis, increase minutes ventilation, decrease PEEP, creatinine increased from 0.73 to 2.7  1/19 " D/C pipercillin-tazobactam, check C diff      Review of Systems  Review of Systems   Unable to perform ROS: Intubated        Vital Signs for last 24 hours   Temp:  [36.7 °C (98.1 °F)-37.1 °C (98.8 °F)] 36.9 °C (98.4 °F)  Pulse:  [] 99  Resp:  [27-38] 34  BP: ()/(41-70) 97/41  SpO2:  [91 %-100 %] 98 %    Hemodynamic parameters for last 24 hours       Respiratory Information for the last 24 hours  Vent Mode: APVCMV  Rate (breaths/min): 34  Vt Target (mL): 440  PEEP/CPAP: 10  MAP: 19  Control VTE (exp VT): 436    Physical Exam   Physical Exam  Vitals signs and nursing note reviewed.   Constitutional:       Appearance: He is ill-appearing.      Interventions: He is sedated and intubated.   HENT:      Head: Normocephalic and atraumatic.      Right Ear: External ear normal.      Left Ear: External ear normal.      Nose: Nose normal.      Mouth/Throat:      Mouth: Mucous membranes are moist.      Pharynx: Oropharynx is clear.   Eyes:      Pupils: Pupils are equal, round, and reactive to light.   Neck:      Musculoskeletal: Normal range of motion and neck supple.   Cardiovascular:      Rate and Rhythm: Normal rate and regular rhythm.      Pulses: Normal pulses.   Pulmonary:      Effort: He is intubated.      Comments: Coarse bilateral breath sounds  Abdominal:      Palpations: Abdomen is soft.   Musculoskeletal: Normal range of motion.   Skin:     General: Skin is warm and dry.      Capillary Refill: Capillary refill takes less than 2 seconds.      Comments: COVID toes   Neurological:      Mental Status: He is unresponsive.         Medications  Current Facility-Administered Medications   Medication Dose Route Frequency Provider Last Rate Last Admin   • Pharmacy Consult Request  1 Each Other PHARMACY TO DOSE Danial Cunha M.D.       • famotidine (PEPCID) tablet 20 mg  20 mg Enteral Tube DAILY Remy Mares D.O.        Or   • famotidine (PEPCID) injection 20 mg  20 mg Intravenous DAILY Remy Mares DKareemO.   20 mg  at 01/19/21 0605   • heparin injection 5,000 Units  5,000 Units Subcutaneous Q8HRS LAWRENCE Wong.OKareem   5,000 Units at 01/19/21 0605   • insulin regular (HumuLIN R,NovoLIN R) injection  3-14 Units Subcutaneous Q6HRS LAWRENCE Wong.O.   3 Units at 01/19/21 0632    And   • dextrose 50% (D50W) injection 50 mL  50 mL Intravenous Q15 MIN PRN LAWRENCE Wong.O.       • midazolam (Versed) 2 MG/2ML injection 5 mg  5 mg Intravenous Q HOUR PRN Sadie Brooks M.D.   5 mg at 01/17/21 0135   • levETIRAcetam (KEPPRA) tablet 500 mg  500 mg Enteral Tube BID Orestes Ernandez M.D.   500 mg at 01/19/21 0502   • propofol (DIPRIVAN) injection  0-80 mcg/kg/min Intravenous Continuous Orestes Ernandez M.D. 19.3 mL/hr at 01/19/21 0805 50 mcg/kg/min at 01/19/21 0805   • fentaNYL (SUBLIMAZE) 50 mcg/mL in 50mL (Continuous Infusion)   Intravenous Continuous Orestes Ernandez M.D. 4 mL/hr at 01/19/21 0915 200 mcg/hr at 01/19/21 0915   • MD Alert...ICU Electrolyte Replacement per Pharmacy   Other PHARMACY TO DOSE Sadie Brooks M.D.       • lidocaine (XYLOCAINE) 1 % injection 1-2 mL  1-2 mL Tracheal Tube Q30 MIN PRN Sadie Brooks M.D.       • fentaNYL (SUBLIMAZE) injection 25 mcg  25 mcg Intravenous Q HOUR PRN Sadie Brooks M.D.        Or   • fentaNYL (SUBLIMAZE) injection 50 mcg  50 mcg Intravenous Q HOUR PRN Sadie Brooks M.D.        Or   • fentaNYL (SUBLIMAZE) injection 100 mcg  100 mcg Intravenous Q HOUR PRN Sadie Brooks M.D.   100 mcg at 01/18/21 0408   • phenytoin (DILANTIN) injection 200 mg  200 mg Intravenous Q8HRS Orestes Ernandez M.D.   200 mg at 01/19/21 0503   • acetaminophen (Tylenol) tablet 650 mg  650 mg Enteral Tube Q6HRS PRN Orestes Ernandez M.D.   650 mg at 01/18/21 0417   • Pharmacy Consult: Enteral tube insertion - review meds/change route/product selection   Other PHARMACY TO DOSE Orestes Ernandez M.D.       • ondansetron (ZOFRAN ODT) dispertab 4 mg  4 mg Enteral Tube Q6HRS PRN Orestes Ernandez M.D.       • haloperidol  (HALDOL) tablet 2 mg  2 mg Enteral Tube BID Orestes Ernandez M.D.   2 mg at 01/19/21 0502   • ondansetron (ZOFRAN) syringe/vial injection 4 mg  4 mg Intravenous Q6HRS PRN Fred Roman M.D.       • labetalol (NORMODYNE/TRANDATE) injection 10 mg  10 mg Intravenous Q6HRS PRN Fred Roman M.D.       • Respiratory Therapy Consult   Nebulization Continuous RT Fred Roman M.D.       • ipratropium-albuterol (DUONEB) nebulizer solution  3 mL Nebulization Q2HRS PRN (RT) Fred Roman M.D.       • norepinephrine (Levophed) infusion 8 mg/250 mL (premix)  0-30 mcg/min Intravenous Continuous Orestes Ernandez M.D. 48.8 mL/hr at 01/19/21 1015 26 mcg/min at 01/19/21 1015       Fluids    Intake/Output Summary (Last 24 hours) at 1/19/2021 1131  Last data filed at 1/19/2021 0828  Gross per 24 hour   Intake 813.33 ml   Output 1115 ml   Net -301.67 ml       Laboratory  Recent Labs     01/18/21  0844 01/18/21  1007 01/19/21  0218   ISTATAPH 7.144* 7.165* 7.266*   ISTATAPCO2 73.1* 67.6* 51.6*   ISTATAPO2 51* 63* 58*   ISTATATCO2 27 26 25   VGUGETN2IYK 73* 84* 85*   ISTATARTHCO3 25.1* 24.4 23.5   ISTATARTBE -5* -5* -4   ISTATTEMP 37.0 C 99.6 F 37.1 C   ISTATFIO2 100 100 55   ISTATSPEC Arterial Arterial Arterial   ISTATAPHTC 7.144* 7.158* 7.265*   PTBQLVBL1KL 51* 66 59*         Recent Labs     01/17/21  0525 01/18/21  0540 01/19/21  0532   SODIUM 134* 136 147*   POTASSIUM 4.5 5.7* 5.7*   CHLORIDE 100 101 109   CO2 24 22 20   BUN 18 40* 68*   CREATININE 0.73 2.37* 3.62*   MAGNESIUM 2.1 2.6*  --    PHOSPHORUS 3.7 5.2*  --    CALCIUM 7.8* 7.8* 7.8*     Recent Labs     01/17/21 0525 01/18/21  0540 01/19/21  0532   ALTSGPT  --  327*  --    ASTSGOT  --  779*  --    ALKPHOSPHAT  --  110*  --    TBILIRUBIN  --  0.3  --    PREALBUMIN  --  5.4*  --    GLUCOSE 161* 215* 193*     Recent Labs     01/17/21 0525 01/18/21 0540 01/19/21  0532   WBC 6.5 25.6* 25.9*   NEUTSPOLYS 71.30 70.60 71.10   LYMPHOCYTES 1.60* 1.70* 2.60*   MONOCYTES 2.50 3.40  4.40   EOSINOPHILS 0.00 0.00 0.00   BASOPHILS 0.80 0.00 0.00   ASTSGOT  --  779*  --    ALTSGPT  --  327*  --    ALKPHOSPHAT  --  110*  --    TBILIRUBIN  --  0.3  --      Recent Labs     01/17/21  0525 01/17/21  0850 01/18/21  0540 01/19/21  0532   RBC 4.89  --  4.26* 4.07*   HEMOGLOBIN 14.8  --  12.8* 12.4*   HEMATOCRIT 47.3  --  42.2 39.5*   PLATELETCT 246  --  237 219   FERRITIN  --  2280.0*  --   --        Imaging  X-Ray:  I have personally reviewed the images and compared with prior images.    Assessment/Plan  * Acute respiratory failure with hypoxia (HCC)  Assessment & Plan  Secondary to severe COVID-19 pneumonia  Intubated 1/12  Extubated 1/13, reintubated 1/15    Ventilator dependent respiratory failure  Modify ventilator to optimize oxygenation and ventilation  HOB > 30  Chlorhexidine  Perform spontaneous breathing trial when able  Early mobility    Pneumonia due to COVID-19 virus  Assessment & Plan  Dexamethasone - completed course  Remdesivir - completed course  Fluid balance - keep euvolemic      Agitation requiring sedation protocol  Assessment & Plan  Titrate fentanyl for pain < 2  Titrate propofol for RASS 0 to -2  Daily sedation holiday when able  Follow [facility] PAD guidelines    Septic shock (HCC)  Assessment & Plan  Due to COVID-19 pneumonia  Titrate norepinephrine to maintain MAP > 65  Antimicrobials - D/C pipercillin-tazobactam as all cultures are negative  Check C diff for watery diarrhea    Counseling regarding end of life decision making  Assessment & Plan  Dr. Wasserman: (1/18/21) Overall very poor prognosis in patient who has severe COVID pneumonia, intubated with multiorgan failure. Code status was made DNR. Today I talked to [facility] Physician, Dr. Reina at [phone] regarding surrogate decision maker.  I updated and explained about pt's critical condition.  He stated he will talk to patient's families and get back to us.      DNAR (do not attempt resuscitation)  Assessment &  Plan  Changed code status to DNAR, CPR in coronavirus 19 on mechanical ventilator nearly 100% mortality, CPR non beneficial in this patient.  Changed to DNAR and intubation ok.    Acute encephalopathy  Assessment & Plan  Secondary to hypoxia  Complicated by underlying psychosis  No focal deficits  qtc prolonged, 510 initial, decreased  Scheduled haldol bid, on daily at home  Required re intubation      Essential hypertension  Assessment & Plan  BP control    Seizure disorder (HCC)  Assessment & Plan  Continue phenytoin    Addendum @ 1700: despite continued therapy the patient     DVT prophylaxis: SQ heparin  PUD prophylaxis: Famotidine  Glycemic control: Sliding scale insulin  Nutrition: Tube feeds  Lines: Needed  Huang: Need for strict I/O monitoring, remove when able  Mobility: Early mobility as tolerated  Goals of care: DNAR  Disposition: ICU    I have performed a physical exam and reviewed and updated ROS and Plan today (2021). In review of yesterday's note (2021), there are no changes except as documented above.     Discussed patient condition and risk of morbidity and/or mortality with RN, RT, Pharmacy and Charge nurse / hot rounds     The patient remains critically ill.  Critical care time = 60 minutes in directly providing and coordinating critical care and extensive data review.  No time overlap and excludes procedures.

## 2021-01-19 NOTE — HOSPITAL COURSE
"Chief Complaint  65 y.o. male admitted 1/12/2021 with covid 19 pneumonia.  Multiple hospitalizations with remdesivir two courses and decadron.  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  Required intubation on transfer 1/12.     Per Dr Roman consult note: 65 y.o. male who presented 1/12/2021 as direct admission for worsening COVID-19 pneumonia and respiratory failure from [facility]; currently inpatient status since 1/3/2021 at [facility].  Patient is a 65-year-old male, inmate at the correctional facility there, history of seizure disorder and remote IVDA and smoking history.  He presented just before [place] and subsequent acquired hospitalization and oxygen.  He was discharged back to the facility on January 2 but had to be immediately readmitted on January 3 with increasing respiratory distress, initially placed on CPAP.  He has been treated with 2 courses of remdesivir (11 total doses), IV corticosteroids, currently on Solu-Medrol 40 mg every 8 hours, 2 different courses of antibiotics currently discontinued and full dose anticoagulation since admission with full dose Lovenox for elevated D-dimer of 20, subsequently dropped to 2.5.  CT and chest x-rays showed bilateral alveolar infiltrates consistent with Covid pneumonia.  He has not developed renal failure or hepatic failure but has developed some \"Covid toes.\"  Over the past 2 to 3 days has had increasing oxygen requirements, currently on Vapotherm (HFNC) 40 L, 100% with oxygen desaturation to the mid 80s.  He initially declined intubation but now would accept a time-limited trial of intubation per report.  He was intubated for transport.     Hospital Course  1/12 intubated on transfer to this facility  1/13 extubated to high-flow nasal cannula, norepinephrine, dexmedetomidine infusion  1/14 diuresis  1/15 reintubation, bronchoscopy w/ BAL  1/17 paralysis for hypoxia  1/18 worsening respiratory acidosis, increase minute ventilation, decrease PEEP, creatinine " increased from 0.73 to 2.7  1/19 D/C pipercillin-tazobactam, check C diff

## 2021-01-19 NOTE — ASSESSMENT & PLAN NOTE
Due to COVID-19 pneumonia  Titrate norepinephrine to maintain MAP > 65  Antimicrobials - D/C pipercillin-tazobactam as all cultures are negative  Check C diff for watery diarrhea

## 2021-01-20 NOTE — PROGRESS NOTES
Time of death 1700. Bedside guards notified group home and arranged transport. Prepared for transport.

## 2021-01-20 NOTE — DISCHARGE SUMMARY
Death Summary    Cause of Death  Multi-system organ failure due to septic shock due to acute hypoxic respiratory due to pneumonia due to COVID-19.    Comorbid Conditions at the Time of Death  Principal Problem:    Acute respiratory failure with hypoxia (HCC) POA: Unknown  Active Problems:    Pneumonia due to COVID-19 virus POA: Unknown    Seizure disorder (HCC) POA: Unknown    Essential hypertension POA: Unknown    Acute encephalopathy POA: Unknown    DNAR (do not attempt resuscitation) POA: Unknown    Counseling regarding end of life decision making POA: Unknown    Septic shock (HCC) POA: Unknown    Agitation requiring sedation protocol POA: Unknown    MADYSON (acute kidney injury) (HCC) POA: Unknown  Resolved Problems:    * No resolved hospital problems. *      History of Presenting Illness and Hospital Course  Chief Complaint  65 y.o. male admitted 1/12/2021 with covid 19 pneumonia.  Multiple hospitalizations with remdesivir two courses and decadron.  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  Required intubation on transfer 1/12.     Per Dr Roman consult note: 65 y.o. male who presented 1/12/2021 as direct admission for worsening COVID-19 pneumonia and respiratory failure from [facility]; currently inpatient status since 1/3/2021 at [facility].  Patient is a 65-year-old male, inmate at the correctional facility there, history of seizure disorder and remote IVDA and smoking history.  He presented just before [place] and subsequent acquired hospitalization and oxygen.  He was discharged back to the facility on January 2 but had to be immediately readmitted on January 3 with increasing respiratory distress, initially placed on CPAP.  He has been treated with 2 courses of remdesivir (11 total doses), IV corticosteroids, currently on Solu-Medrol 40 mg every 8 hours, 2 different courses of antibiotics currently discontinued and full dose anticoagulation since admission with full dose Lovenox for elevated D-dimer of 20,  "subsequently dropped to 2.5.  CT and chest x-rays showed bilateral alveolar infiltrates consistent with Covid pneumonia.  He has not developed renal failure or hepatic failure but has developed some \"Covid toes.\"  Over the past 2 to 3 days has had increasing oxygen requirements, currently on Vapotherm (HFNC) 40 L, 100% with oxygen desaturation to the mid 80s.  He initially declined intubation but now would accept a time-limited trial of intubation per report.  He was intubated for transport.     Hospital Course  1/12 intubated on transfer to this facility  1/13 extubated to high-flow nasal cannula, norepinephrine, dexmedetomidine infusion  1/14 diuresis  1/15 reintubation, bronchoscopy w/ BAL  1/17 paralysis for hypoxia  1/18 worsening respiratory acidosis, increase minute ventilation, decrease PEEP, creatinine increased from 0.73 to 2.7  1/19 D/C pipercillin-tazobactam, check C diff, TOD 1700                        "

## 2021-01-21 NOTE — DOCUMENTATION QUERY
"                                                                         [facility]                                                                       Query Response Note      PATIENT:               LUIS SANCHEZ  ACCT #:                  [id number]  MRN:                     [id number]  :                      1955  ADMIT DATE:       2021 11:21 PM  DISCH DATE:        2021 5:00 PM  RESPONDING  PROVIDER #:        [id number]           QUERY TEXT:    \"Initially COVID-19 + -rain\" is documented in the Critical Care Consultation Note.. Pt presented with Pneumonia due to COVID-19 virus.  Please clarify the status of the patient?s COVID-19 infection.    NOTE: if an appropriate response is not listed below, please respond with a new note      The patient's clinical indicators include:  Per Critical Care Consultation Note/ H&P:  Pneumonia due to COVID-19 virus, initially COVID-19 +  rain.  Completed 2 courses of Remdesivir (11 doses), remains on corticosteroids.    Per DC Summary:  admitted with covid 19 pneumonia.  Multiple hospitalizations with remdesivir two courses and decadron.  Risk Factors: COVID 19 pna, prisoner   Treatment: Remdesivir and decadron completed prior to this hospitalization, Zosyn, vanco     Thank You,  Kumar Gavin RN, BSN  Clinical    Connect via [employer]  Options provided:   -- COVID-19 is not a current infection, and the stated condition is a residual effect/sequelae of COVID-19   -- COVID-19 continues to be a current and active infection, and the stated condition is a continuation of its symptoms   -- Unable to determine      Query created by: Kumar Gavin on 2021 11:21 AM    RESPONSE TEXT:    Unable to determine          Electronically signed by:  JONE ENGLISH MD 2021 3:40 PM              "

## 2021-01-21 NOTE — DOCUMENTATION QUERY
[facility]                                                                       Query Response Note      PATIENT:               LUIS SANCHEZ  ACCT #:                  [id number]  MRN:                     [id number]  :                      1955  ADMIT DATE:       2021 11:21 PM  DISCH DATE:        2021 5:00 PM  RESPONDING  PROVIDER #:        [id number]           QUERY TEXT:    Septic shock is documented in the Medical Record beginning with the  Progress Note.  Your help is needed.  Please clarify the POA status of sepsis and septic shock.    The patient's Clinical Indicators include:  Per  Pulmonary Progress Note: septic shock  Per DC Summary:  Septic shock, POA: unknown   Admit vitals: 89/62, 77, 18-24, 98.2F, 97%    wbc 15.5  Risk Factors: COVID pneumonia  Treatment: zosyn, vanco, norepinephrine, fareed-synephrine, vasopressin, IVF    Thank You,  Kumar Gavin RN, BSN  Clinical    Connect via [employer]  Options provided:   -- Sepsis without septic shock was present on admission   -- Sepsis with septic shock was present on admission   -- Sepsis was not present on admission   -- Unable to determine      Query created by: Kumar Gavin on 2021 11:11 AM    RESPONSE TEXT:    Sepsis was not present on admission          Electronically signed by:  ESSIE PRINGLE MD 2021 2:39 PM

## 2021-01-22 LAB
BACTERIA BLD CULT: NORMAL
BACTERIA BLD CULT: NORMAL
SIGNIFICANT IND 70042: NORMAL
SIGNIFICANT IND 70042: NORMAL
SITE SITE: NORMAL
SITE SITE: NORMAL
SOURCE SOURCE: NORMAL
SOURCE SOURCE: NORMAL